# Patient Record
Sex: FEMALE | Race: WHITE | NOT HISPANIC OR LATINO | Employment: OTHER | URBAN - METROPOLITAN AREA
[De-identification: names, ages, dates, MRNs, and addresses within clinical notes are randomized per-mention and may not be internally consistent; named-entity substitution may affect disease eponyms.]

---

## 2017-01-18 ENCOUNTER — HISTORICAL (OUTPATIENT)
Dept: LAB | Facility: HOSPITAL | Age: 57
End: 2017-01-18

## 2017-03-02 ENCOUNTER — HISTORICAL (OUTPATIENT)
Dept: LAB | Facility: HOSPITAL | Age: 57
End: 2017-03-02

## 2017-03-21 ENCOUNTER — HISTORICAL (OUTPATIENT)
Dept: ADMINISTRATIVE | Facility: HOSPITAL | Age: 57
End: 2017-03-21

## 2017-03-28 ENCOUNTER — HISTORICAL (OUTPATIENT)
Dept: LAB | Facility: HOSPITAL | Age: 57
End: 2017-03-28

## 2017-05-18 ENCOUNTER — HISTORICAL (OUTPATIENT)
Dept: LAB | Facility: HOSPITAL | Age: 57
End: 2017-05-18

## 2017-05-18 ENCOUNTER — APPOINTMENT (RX ONLY)
Age: 57
Setting detail: DERMATOLOGY
End: 2017-05-18

## 2017-05-18 ENCOUNTER — HISTORICAL (OUTPATIENT)
Dept: PREADMISSION TESTING | Facility: HOSPITAL | Age: 57
End: 2017-05-18

## 2017-05-18 DIAGNOSIS — L71.8 OTHER ROSACEA: ICD-10-CM | Status: STABLE

## 2017-05-18 DIAGNOSIS — L57.0 ACTINIC KERATOSIS: ICD-10-CM | Status: INADEQUATELY CONTROLLED

## 2017-05-18 DIAGNOSIS — L82.1 OTHER SEBORRHEIC KERATOSIS: ICD-10-CM | Status: STABLE

## 2017-05-18 DIAGNOSIS — D22 MELANOCYTIC NEVI: ICD-10-CM | Status: STABLE

## 2017-05-18 PROBLEM — D48.5 NEOPLASM OF UNCERTAIN BEHAVIOR OF SKIN: Status: ACTIVE | Noted: 2017-05-18

## 2017-05-18 PROBLEM — D22.5 MELANOCYTIC NEVI OF TRUNK: Status: ACTIVE | Noted: 2017-05-18

## 2017-05-18 LAB
ABS NEUT (OLG): 4.28 X10(3)/MCL (ref 2.1–9.2)
APTT PPP: 31.7 SECOND(S) (ref 20.6–36)
BASOPHILS # BLD AUTO: 0 X10(3)/MCL (ref 0–0.2)
BASOPHILS NFR BLD AUTO: 0 %
BUN SERPL-MCNC: 16 MG/DL (ref 7–18)
CALCIUM SERPL-MCNC: 9 MG/DL (ref 8.5–10.1)
CHLORIDE SERPL-SCNC: 103 MMOL/L (ref 98–107)
CO2 SERPL-SCNC: 30 MMOL/L (ref 21–32)
CREAT SERPL-MCNC: 1.1 MG/DL (ref 0.55–1.02)
EOSINOPHIL # BLD AUTO: 0.1 X10(3)/MCL (ref 0–0.9)
EOSINOPHIL NFR BLD AUTO: 1 %
ERYTHROCYTE [DISTWIDTH] IN BLOOD BY AUTOMATED COUNT: 14.6 % (ref 11.5–17)
GLUCOSE SERPL-MCNC: 71 MG/DL (ref 74–106)
HCT VFR BLD AUTO: 43.4 % (ref 37–47)
HGB BLD-MCNC: 13.9 GM/DL (ref 12–16)
INR PPP: 0.98 (ref 0–1.27)
LYMPHOCYTES # BLD AUTO: 3 X10(3)/MCL (ref 0.6–4.6)
LYMPHOCYTES NFR BLD AUTO: 36 %
MCH RBC QN AUTO: 29.4 PG (ref 27–31)
MCHC RBC AUTO-ENTMCNC: 32 GM/DL (ref 33–36)
MCV RBC AUTO: 91.8 FL (ref 80–94)
MONOCYTES # BLD AUTO: 0.8 X10(3)/MCL (ref 0.1–1.3)
MONOCYTES NFR BLD AUTO: 10 %
NEUTROPHILS # BLD AUTO: 4.28 X10(3)/MCL (ref 1.4–7.9)
NEUTROPHILS NFR BLD AUTO: 52 %
PLATELET # BLD AUTO: 346 X10(3)/MCL (ref 130–400)
PMV BLD AUTO: 9.2 FL (ref 9.4–12.4)
POTASSIUM SERPL-SCNC: 4.4 MMOL/L (ref 3.5–5.1)
PROTHROMBIN TIME: 12.8 SECOND(S) (ref 12.1–14.2)
RBC # BLD AUTO: 4.73 X10(6)/MCL (ref 4.2–5.4)
SODIUM SERPL-SCNC: 140 MMOL/L (ref 136–145)
WBC # SPEC AUTO: 8.2 X10(3)/MCL (ref 4.5–11.5)

## 2017-05-18 PROCEDURE — ? COUNSELING

## 2017-05-18 PROCEDURE — 17000 DESTRUCT PREMALG LESION: CPT

## 2017-05-18 PROCEDURE — 99214 OFFICE O/P EST MOD 30 MIN: CPT | Mod: 25

## 2017-05-18 PROCEDURE — ? LIQUID NITROGEN

## 2017-05-18 PROCEDURE — ? PRESCRIPTION

## 2017-05-18 PROCEDURE — ? BIOPSY BY SHAVE METHOD

## 2017-05-18 PROCEDURE — 11100: CPT | Mod: 59

## 2017-05-18 PROCEDURE — ? ORDER FOR PHOTODYNAMIC THERAPY

## 2017-05-18 RX ORDER — TRETINOIN 0.8 MG/G
GEL TOPICAL
Qty: 1 | Refills: 1 | Status: ERX

## 2017-05-18 ASSESSMENT — LOCATION SIMPLE DESCRIPTION DERM
LOCATION SIMPLE: LEFT FOREHEAD
LOCATION SIMPLE: CHEST
LOCATION SIMPLE: RIGHT FOREARM
LOCATION SIMPLE: LEFT CHEEK
LOCATION SIMPLE: UPPER BACK

## 2017-05-18 ASSESSMENT — LOCATION ZONE DERM
LOCATION ZONE: ARM
LOCATION ZONE: TRUNK
LOCATION ZONE: FACE

## 2017-05-18 ASSESSMENT — LOCATION DETAILED DESCRIPTION DERM
LOCATION DETAILED: MIDDLE STERNUM
LOCATION DETAILED: SUPERIOR THORACIC SPINE
LOCATION DETAILED: LEFT INFERIOR CENTRAL MALAR CHEEK
LOCATION DETAILED: LEFT MID PREAURICULAR CHEEK
LOCATION DETAILED: LEFT INFERIOR MEDIAL FOREHEAD
LOCATION DETAILED: RIGHT PROXIMAL RADIAL DORSAL FOREARM

## 2017-05-18 ASSESSMENT — SEVERITY ASSESSMENT OVERALL AMONG ALL PATIENTS: IN YOUR EXPERIENCE, AMONG ALL PATIENTS YOU HAVE SEEN WITH THIS CONDITION, HOW SEVERE IS THIS PATIENT'S CONDITION?: MILD

## 2017-05-18 NOTE — PROCEDURE: BIOPSY BY SHAVE METHOD
Biopsy Type: H and E
Dressing: bandage
Destruction After The Procedure: No
Silver Nitrate Text: The wound bed was treated with silver nitrate after the biopsy was performed.
Post-Care Instructions: I reviewed with the patient in detail post-care instructions. Patient is to keep the biopsy site dry overnight, and then apply bacitracin twice daily until healed. Patient may apply hydrogen peroxide soaks to remove any crusting.
Anesthesia Type: 1% lidocaine without epinephrine
Cryotherapy Text: The wound bed was treated with cryotherapy after the biopsy was performed.
X Size Of Lesion In Cm: 0
Additional Anticipated Plans: If malignant consider Mohs surgery
Electrodesiccation Text: The wound bed was treated with electrodesiccation after the biopsy was performed.
Type Of Destruction Used: Electrodesiccation and Curettage
Billing Type: Third-Party Bill
Detail Level: Detailed
Curettage Text: The wound bed was treated with curettage after the biopsy was performed.
Size Of Lesion In Cm: 0.2
Hemostasis: Electrocautery
Biopsy Method: Dermablade
Notification Instructions: Patient will be notified of biopsy results. However, patient instructed to call the office if not contacted within 2 weeks.
Wound Care: Aquaphor
Anesthesia Volume In Cc: 0.5
Electrodesiccation And Curettage Text: The wound bed was treated with electrodesiccation and curettage after the biopsy was performed.
Consent: Verbal consent was obtained and risks were reviewed including but not limited to scarring, infection, bleeding, scabbing, incomplete removal, nerve damage and allergy to anesthesia.

## 2017-05-18 NOTE — PROCEDURE: ORDER FOR PHOTODYNAMIC THERAPY
Pdt Type: CECILIO-U
Scalp Incubation Time: 90 minutes
Detail Level: Zone
Face Incubation Time: 2 hours
Frequency Of Pdt: Two treatments
Face And Neck Incubation Time: Hour
Location Override: Face
Face And Ears Incubation Time: 2 Hour
Consent: The procedure and risks were reviewed with the patient including but not limited to: burning, pigmentary changes, pain, blistering, scabbing, redness, and the possibility of needing numerous treatments. Strict photoprotection after the procedure.

## 2017-05-30 ENCOUNTER — HISTORICAL (OUTPATIENT)
Dept: SURGERY | Facility: HOSPITAL | Age: 57
End: 2017-05-30

## 2017-08-04 ENCOUNTER — HISTORICAL (OUTPATIENT)
Dept: LAB | Facility: HOSPITAL | Age: 57
End: 2017-08-04

## 2017-08-04 LAB — TSH SERPL-ACNC: 1.3 MIU/ML (ref 0.35–3.75)

## 2017-08-09 ENCOUNTER — HISTORICAL (OUTPATIENT)
Dept: RADIOLOGY | Facility: HOSPITAL | Age: 57
End: 2017-08-09

## 2017-09-07 ENCOUNTER — APPOINTMENT (RX ONLY)
Age: 57
Setting detail: DERMATOLOGY
End: 2017-09-07

## 2017-09-07 DIAGNOSIS — L65.8 OTHER SPECIFIED NONSCARRING HAIR LOSS: ICD-10-CM | Status: INADEQUATELY CONTROLLED

## 2017-09-07 DIAGNOSIS — L63.8 OTHER ALOPECIA AREATA: ICD-10-CM | Status: INADEQUATELY CONTROLLED

## 2017-09-07 DIAGNOSIS — L57.0 ACTINIC KERATOSIS: ICD-10-CM | Status: INADEQUATELY CONTROLLED

## 2017-09-07 PROCEDURE — ? EDUCATIONAL RESOURCES PROVIDED

## 2017-09-07 PROCEDURE — ? TREATMENT REGIMEN

## 2017-09-07 PROCEDURE — ? ORDER FOR PHOTODYNAMIC THERAPY

## 2017-09-07 PROCEDURE — 99214 OFFICE O/P EST MOD 30 MIN: CPT

## 2017-09-07 PROCEDURE — ? COUNSELING

## 2017-09-07 PROCEDURE — ? DEFER

## 2017-09-07 ASSESSMENT — WOMENS ALOPECIA SEVERITY SCALE: PLEASE ASSSESS THE PATIENT'S MID SCALP ALOPECIA SEVERITY BY COMPARING IT TO THESE PHOTOS: MILD HAIR LOSS

## 2017-09-07 ASSESSMENT — LOCATION ZONE DERM
LOCATION ZONE: FACE
LOCATION ZONE: SCALP

## 2017-09-07 ASSESSMENT — LOCATION DETAILED DESCRIPTION DERM
LOCATION DETAILED: LEFT SUPERIOR PARIETAL SCALP
LOCATION DETAILED: MID-OCCIPITAL SCALP
LOCATION DETAILED: LEFT MEDIAL FOREHEAD

## 2017-09-07 ASSESSMENT — SEVERITY ASSESSMENT OVERALL AMONG ALL PATIENTS
IN YOUR EXPERIENCE, AMONG ALL PATIENTS YOU HAVE SEEN WITH THIS CONDITION, HOW SEVERE IS THIS PATIENT'S CONDITION?: S1 (LESS THAN 25% HAIR LOSS)

## 2017-09-07 ASSESSMENT — LOCATION SIMPLE DESCRIPTION DERM
LOCATION SIMPLE: POSTERIOR SCALP
LOCATION SIMPLE: LEFT FOREHEAD
LOCATION SIMPLE: SCALP

## 2017-09-07 NOTE — PROCEDURE: TREATMENT REGIMEN
Notification: Please note that there are new Treatment Regimen plans which have an enhanced patient education handout experience.  The plans are called Treatment Regimen (Acne), Treatment Regimen (Atopic Dermatitis), Treatment Regimen (Rosacea), Treatment Regimen (Sun Protection), Treatment Regimen (Cosmetic Products), and Treatment Regimen (Xerosis).
Detail Level: Detailed
Otc Regimen: Allegra 1 daily.\\nMinoxidil 5% solution/foam.\\nBiotin 2-2.5mg daily

## 2017-09-07 NOTE — PROCEDURE: ORDER FOR PHOTODYNAMIC THERAPY
Scalp Incubation Time: 90 minutes
Hands Incubation Time: 2 Hours
Detail Level: Zone
Incubation Override: 120 minutes
Frequency Of Pdt: Single Treatment
Location Override: face
Face And Ears Incubation Time: 2 Hour
Face Incubation Time: 105 minutes
Face And Neck Incubation Time: Hour
Pdt Type: CECILIO-U
Consent: The procedure and risks were reviewed with the patient including but not limited to: burning, pigmentary changes, pain, blistering, scabbing, redness, and the possibility of needing numerous treatments. Strict photoprotection after the procedure.

## 2017-09-07 NOTE — HPI: HAIR LOSS
How Did The Hair Loss Occur?: sudden in onset
How Severe Is Your Hair Loss?: mild
Additional History: PCP did lab work last month and WNL (thyroid, iron and Vitamin D)

## 2017-09-25 ENCOUNTER — HISTORICAL (OUTPATIENT)
Dept: ADMINISTRATIVE | Facility: HOSPITAL | Age: 57
End: 2017-09-25

## 2017-10-04 ENCOUNTER — APPOINTMENT (RX ONLY)
Age: 57
Setting detail: DERMATOLOGY
End: 2017-10-04

## 2017-10-04 DIAGNOSIS — L57.8 OTHER SKIN CHANGES DUE TO CHRONIC EXPOSURE TO NONIONIZING RADIATION: ICD-10-CM | Status: INADEQUATELY CONTROLLED

## 2017-10-04 DIAGNOSIS — L57.0 ACTINIC KERATOSIS: ICD-10-CM

## 2017-10-04 PROCEDURE — ? COUNSELING

## 2017-10-04 PROCEDURE — 99213 OFFICE O/P EST LOW 20 MIN: CPT | Mod: 25

## 2017-10-04 PROCEDURE — ? PDT: BLUE

## 2017-10-04 PROCEDURE — 96567 PDT DSTR PRMLG LES SKN: CPT

## 2017-10-04 ASSESSMENT — LOCATION DETAILED DESCRIPTION DERM
LOCATION DETAILED: RIGHT INFERIOR CENTRAL MALAR CHEEK
LOCATION DETAILED: LEFT INFERIOR CENTRAL MALAR CHEEK

## 2017-10-04 ASSESSMENT — LOCATION SIMPLE DESCRIPTION DERM
LOCATION SIMPLE: LEFT CHEEK
LOCATION SIMPLE: RIGHT CHEEK

## 2017-10-04 ASSESSMENT — LOCATION ZONE DERM: LOCATION ZONE: FACE

## 2017-10-04 NOTE — PROCEDURE: PDT: BLUE
Which Photosensitizer Was Used: Levulan
Anesthesia Type: 1% lidocaine with epinephrine
Occlusion: No
Pre-Procedure Text: The treatment areas were cleaned and prepped in the usual fashion.
Total Number Of Aks Treated (Optional To Report): 0
Post-Care Instructions: I reviewed with the patient in detail post-care instructions. Patient is to avoid sunlight for the next 2 days, and wear sun protection. Patients may expect sunburn like redness, discomfort and scabbing.
Comments: Patient’s  is driving her home with a towel over her face, samples given
Consent: Verbal consent obtained.  The risks were reviewed with the patient including but not limited to: pigmentary changes, pain, blistering, scabbing, redness, and the remote possibility of scarring.
Ndc# (Optional): [id number]
Light Source: Fuad-U
Number Of Kerasticks/Tubes Billed For: 1
Detail Level: Zone
Incubation Time: 2 hours 15 minutes
Illumination Time: 00:16:40

## 2017-10-17 ENCOUNTER — HISTORICAL (OUTPATIENT)
Dept: ADMINISTRATIVE | Facility: HOSPITAL | Age: 57
End: 2017-10-17

## 2017-11-06 ENCOUNTER — APPOINTMENT (RX ONLY)
Age: 57
Setting detail: DERMATOLOGY
End: 2017-11-06

## 2017-11-06 DIAGNOSIS — L82.1 OTHER SEBORRHEIC KERATOSIS: ICD-10-CM | Status: STABLE

## 2017-11-06 DIAGNOSIS — Z87.2 PERSONAL HISTORY OF DISEASES OF THE SKIN AND SUBCUTANEOUS TISSUE: ICD-10-CM | Status: RESOLVED

## 2017-11-06 DIAGNOSIS — D22 MELANOCYTIC NEVI: ICD-10-CM | Status: STABLE

## 2017-11-06 DIAGNOSIS — L63.8 OTHER ALOPECIA AREATA: ICD-10-CM | Status: RESOLVING

## 2017-11-06 DIAGNOSIS — L71.8 OTHER ROSACEA: ICD-10-CM | Status: STABLE

## 2017-11-06 DIAGNOSIS — L65.8 OTHER SPECIFIED NONSCARRING HAIR LOSS: ICD-10-CM | Status: IMPROVED

## 2017-11-06 PROBLEM — D22.39 MELANOCYTIC NEVI OF OTHER PARTS OF FACE: Status: ACTIVE | Noted: 2017-11-06

## 2017-11-06 PROCEDURE — ? COUNSELING

## 2017-11-06 PROCEDURE — ? TREATMENT REGIMEN

## 2017-11-06 PROCEDURE — 99212 OFFICE O/P EST SF 10 MIN: CPT

## 2017-11-06 ASSESSMENT — LOCATION DETAILED DESCRIPTION DERM
LOCATION DETAILED: RIGHT MEDIAL FOREHEAD
LOCATION DETAILED: INFERIOR MID FOREHEAD
LOCATION DETAILED: MID-OCCIPITAL SCALP

## 2017-11-06 ASSESSMENT — SEVERITY ASSESSMENT OVERALL AMONG ALL PATIENTS
IN YOUR EXPERIENCE, AMONG ALL PATIENTS YOU HAVE SEEN WITH THIS CONDITION, HOW SEVERE IS THIS PATIENT'S CONDITION?: S0 (NO HAIR LOSS)
IN YOUR EXPERIENCE, AMONG ALL PATIENTS YOU HAVE SEEN WITH THIS CONDITION, HOW SEVERE IS THIS PATIENT'S CONDITION?: MILD

## 2017-11-06 ASSESSMENT — LOCATION ZONE DERM
LOCATION ZONE: SCALP
LOCATION ZONE: FACE

## 2017-11-06 ASSESSMENT — LOCATION SIMPLE DESCRIPTION DERM
LOCATION SIMPLE: RIGHT FOREHEAD
LOCATION SIMPLE: INFERIOR FOREHEAD
LOCATION SIMPLE: POSTERIOR SCALP

## 2017-11-06 ASSESSMENT — WOMENS ALOPECIA SEVERITY SCALE: PLEASE ASSSESS THE PATIENT'S MID SCALP ALOPECIA SEVERITY BY COMPARING IT TO THESE PHOTOS: MILD HAIR LOSS

## 2017-11-06 NOTE — PROCEDURE: TREATMENT REGIMEN
Continue Regimen: Biotin 2.5-3mg daily
Detail Level: Zone
Detail Level: Detailed
Continue Regimen: Biotin 2.5-3mg daily as per patient’s request
Continue Regimen: Retin-A micro 0.8% gel daily

## 2018-02-05 ENCOUNTER — HISTORICAL (OUTPATIENT)
Dept: LAB | Facility: HOSPITAL | Age: 58
End: 2018-02-05

## 2018-02-05 LAB — TSH SERPL-ACNC: 1.25 MIU/ML (ref 0.35–3.75)

## 2018-05-03 ENCOUNTER — APPOINTMENT (RX ONLY)
Age: 58
Setting detail: DERMATOLOGY
End: 2018-05-03

## 2018-05-03 DIAGNOSIS — L82.1 OTHER SEBORRHEIC KERATOSIS: ICD-10-CM | Status: STABLE

## 2018-05-03 DIAGNOSIS — D22 MELANOCYTIC NEVI: ICD-10-CM | Status: STABLE

## 2018-05-03 DIAGNOSIS — L71.8 OTHER ROSACEA: ICD-10-CM | Status: STABLE

## 2018-05-03 DIAGNOSIS — L57.0 ACTINIC KERATOSIS: ICD-10-CM | Status: INADEQUATELY CONTROLLED

## 2018-05-03 PROBLEM — D22.5 MELANOCYTIC NEVI OF TRUNK: Status: ACTIVE | Noted: 2018-05-03

## 2018-05-03 PROCEDURE — ? LIQUID NITROGEN

## 2018-05-03 PROCEDURE — 99214 OFFICE O/P EST MOD 30 MIN: CPT | Mod: 25

## 2018-05-03 PROCEDURE — ? COUNSELING

## 2018-05-03 PROCEDURE — ? TREATMENT REGIMEN

## 2018-05-03 PROCEDURE — 17000 DESTRUCT PREMALG LESION: CPT

## 2018-05-03 ASSESSMENT — SEVERITY ASSESSMENT OVERALL AMONG ALL PATIENTS: IN YOUR EXPERIENCE, AMONG ALL PATIENTS YOU HAVE SEEN WITH THIS CONDITION, HOW SEVERE IS THIS PATIENT'S CONDITION?: MILD

## 2018-05-03 ASSESSMENT — LOCATION SIMPLE DESCRIPTION DERM
LOCATION SIMPLE: LEFT CHEEK
LOCATION SIMPLE: LEFT ZYGOMA
LOCATION SIMPLE: RIGHT UPPER BACK
LOCATION SIMPLE: CHEST

## 2018-05-03 ASSESSMENT — LOCATION ZONE DERM
LOCATION ZONE: FACE
LOCATION ZONE: TRUNK

## 2018-05-03 ASSESSMENT — LOCATION DETAILED DESCRIPTION DERM
LOCATION DETAILED: LOWER STERNUM
LOCATION DETAILED: LEFT INFERIOR CENTRAL MALAR CHEEK
LOCATION DETAILED: LEFT CENTRAL ZYGOMA
LOCATION DETAILED: RIGHT SUPERIOR MEDIAL UPPER BACK

## 2018-07-21 ENCOUNTER — HISTORICAL (OUTPATIENT)
Dept: LAB | Facility: HOSPITAL | Age: 58
End: 2018-07-21

## 2018-07-21 LAB
ALBUMIN SERPL-MCNC: 3.2 GM/DL (ref 3.4–5)
ALBUMIN/GLOB SERPL: 0.9 RATIO (ref 1.1–2)
ALP SERPL-CCNC: 73 UNIT/L (ref 46–116)
ALT SERPL-CCNC: 26 UNIT/L (ref 12–78)
AST SERPL-CCNC: 13 UNIT/L (ref 10–37)
BILIRUB SERPL-MCNC: 0.2 MG/DL (ref 0.2–1)
BILIRUBIN DIRECT+TOT PNL SERPL-MCNC: 0.08 MG/DL (ref 0–0.2)
BILIRUBIN DIRECT+TOT PNL SERPL-MCNC: 0.12 MG/DL
BUN SERPL-MCNC: 17 MG/DL (ref 7–18)
CALCIUM SERPL-MCNC: 8.6 MG/DL (ref 8.5–10.1)
CHLORIDE SERPL-SCNC: 106 MMOL/L (ref 98–107)
CHOLEST SERPL-MCNC: 190 MG/DL (ref 50–200)
CHOLEST/HDLC SERPL: 3 {RATIO} (ref 0–5)
CO2 SERPL-SCNC: 27.6 MMOL/L (ref 21–32)
CREAT SERPL-MCNC: 0.68 MG/DL (ref 0.55–1.02)
GLOBULIN SER-MCNC: 3.6 GM/DL (ref 2.4–3.5)
GLUCOSE SERPL-MCNC: 98 MG/DL (ref 74–106)
HDLC SERPL-MCNC: 57 MG/DL (ref 35–60)
LDLC SERPL CALC-MCNC: 117 MG/DL (ref 50–140)
POTASSIUM SERPL-SCNC: 4.2 MMOL/L (ref 3.5–5.1)
PROT SERPL-MCNC: 6.8 GM/DL (ref 6.4–8.2)
SODIUM SERPL-SCNC: 142 MMOL/L (ref 136–145)
TRIGL SERPL-MCNC: 79 MG/DL (ref 30–150)
TSH SERPL-ACNC: 1.37 MIU/ML (ref 0.35–3.75)
VLDLC SERPL CALC-MCNC: 16 MG/DL

## 2018-08-08 ENCOUNTER — HISTORICAL (OUTPATIENT)
Dept: RADIOLOGY | Facility: HOSPITAL | Age: 58
End: 2018-08-08

## 2018-11-06 ENCOUNTER — APPOINTMENT (RX ONLY)
Age: 58
Setting detail: DERMATOLOGY
End: 2018-11-06

## 2018-11-06 DIAGNOSIS — L71.8 OTHER ROSACEA: ICD-10-CM | Status: STABLE

## 2018-11-06 DIAGNOSIS — L82.1 OTHER SEBORRHEIC KERATOSIS: ICD-10-CM | Status: STABLE

## 2018-11-06 DIAGNOSIS — D22 MELANOCYTIC NEVI: ICD-10-CM | Status: STABLE

## 2018-11-06 DIAGNOSIS — Z87.2 PERSONAL HISTORY OF DISEASES OF THE SKIN AND SUBCUTANEOUS TISSUE: ICD-10-CM | Status: WELL CONTROLLED

## 2018-11-06 PROBLEM — D22.5 MELANOCYTIC NEVI OF TRUNK: Status: ACTIVE | Noted: 2018-11-06

## 2018-11-06 PROCEDURE — 99214 OFFICE O/P EST MOD 30 MIN: CPT

## 2018-11-06 PROCEDURE — ? TREATMENT REGIMEN

## 2018-11-06 PROCEDURE — ? COUNSELING

## 2018-11-06 ASSESSMENT — LOCATION SIMPLE DESCRIPTION DERM
LOCATION SIMPLE: RIGHT UPPER BACK
LOCATION SIMPLE: RIGHT CHEEK
LOCATION SIMPLE: CHEST
LOCATION SIMPLE: LEFT CHEEK

## 2018-11-06 ASSESSMENT — LOCATION DETAILED DESCRIPTION DERM
LOCATION DETAILED: LEFT INFERIOR CENTRAL MALAR CHEEK
LOCATION DETAILED: RIGHT INFERIOR CENTRAL MALAR CHEEK
LOCATION DETAILED: RIGHT SUPERIOR MEDIAL UPPER BACK
LOCATION DETAILED: LOWER STERNUM

## 2018-11-06 ASSESSMENT — LOCATION ZONE DERM
LOCATION ZONE: FACE
LOCATION ZONE: TRUNK

## 2018-11-06 ASSESSMENT — SEVERITY ASSESSMENT OVERALL AMONG ALL PATIENTS: IN YOUR EXPERIENCE, AMONG ALL PATIENTS YOU HAVE SEEN WITH THIS CONDITION, HOW SEVERE IS THIS PATIENT'S CONDITION?: MILD

## 2018-12-06 ENCOUNTER — HISTORICAL (OUTPATIENT)
Dept: RADIOLOGY | Facility: HOSPITAL | Age: 58
End: 2018-12-06

## 2019-03-27 ENCOUNTER — HISTORICAL (OUTPATIENT)
Dept: LAB | Facility: HOSPITAL | Age: 59
End: 2019-03-27

## 2019-05-08 ENCOUNTER — APPOINTMENT (RX ONLY)
Age: 59
Setting detail: DERMATOLOGY
End: 2019-05-08

## 2019-05-08 DIAGNOSIS — L71.8 OTHER ROSACEA: ICD-10-CM | Status: WELL CONTROLLED

## 2019-05-08 DIAGNOSIS — D22 MELANOCYTIC NEVI: ICD-10-CM | Status: STABLE

## 2019-05-08 DIAGNOSIS — L82.1 OTHER SEBORRHEIC KERATOSIS: ICD-10-CM | Status: STABLE

## 2019-05-08 DIAGNOSIS — L57.0 ACTINIC KERATOSIS: ICD-10-CM | Status: INADEQUATELY CONTROLLED

## 2019-05-08 PROBLEM — D22.39 MELANOCYTIC NEVI OF OTHER PARTS OF FACE: Status: ACTIVE | Noted: 2019-05-08

## 2019-05-08 PROCEDURE — ? TREATMENT REGIMEN

## 2019-05-08 PROCEDURE — ? PATIENT SPECIFIC COUNSELING

## 2019-05-08 PROCEDURE — 99213 OFFICE O/P EST LOW 20 MIN: CPT

## 2019-05-08 PROCEDURE — ? COUNSELING

## 2019-05-08 PROCEDURE — ? PRESCRIPTION

## 2019-05-08 RX ORDER — FLUOROURACIL 40 MG/G
CREAM TOPICAL
Qty: 1 | Refills: 0 | Status: ERX

## 2019-05-08 ASSESSMENT — LOCATION DETAILED DESCRIPTION DERM
LOCATION DETAILED: LEFT MEDIAL FOREHEAD
LOCATION DETAILED: LEFT INFERIOR LATERAL FOREHEAD
LOCATION DETAILED: LEFT INFERIOR CENTRAL MALAR CHEEK

## 2019-05-08 ASSESSMENT — LOCATION ZONE DERM: LOCATION ZONE: FACE

## 2019-05-08 ASSESSMENT — LOCATION SIMPLE DESCRIPTION DERM
LOCATION SIMPLE: LEFT CHEEK
LOCATION SIMPLE: LEFT FOREHEAD

## 2019-05-08 ASSESSMENT — SEVERITY ASSESSMENT OVERALL AMONG ALL PATIENTS: IN YOUR EXPERIENCE, AMONG ALL PATIENTS YOU HAVE SEEN WITH THIS CONDITION, HOW SEVERE IS THIS PATIENT'S CONDITION?: MILD

## 2019-05-08 NOTE — HPI: SKIN LESION
How Severe Is Your Skin Lesion?: mild
Has Your Skin Lesion Been Treated?: not been treated
Is This A New Presentation, Or A Follow-Up?: Skin Lesions
Additional History: Patient declines full skin exam.

## 2019-08-20 ENCOUNTER — HISTORICAL (OUTPATIENT)
Dept: RADIOLOGY | Facility: HOSPITAL | Age: 59
End: 2019-08-20

## 2019-09-10 ENCOUNTER — APPOINTMENT (RX ONLY)
Age: 59
Setting detail: DERMATOLOGY
End: 2019-09-10

## 2019-09-10 DIAGNOSIS — L57.0 ACTINIC KERATOSIS: ICD-10-CM | Status: INADEQUATELY CONTROLLED

## 2019-09-10 DIAGNOSIS — Z87.2 PERSONAL HISTORY OF DISEASES OF THE SKIN AND SUBCUTANEOUS TISSUE: ICD-10-CM | Status: RESOLVED

## 2019-09-10 DIAGNOSIS — L81.8 OTHER SPECIFIED DISORDERS OF PIGMENTATION: ICD-10-CM | Status: RESOLVING

## 2019-09-10 PROCEDURE — 99213 OFFICE O/P EST LOW 20 MIN: CPT

## 2019-09-10 PROCEDURE — ? TREATMENT REGIMEN

## 2019-09-10 ASSESSMENT — LOCATION DETAILED DESCRIPTION DERM
LOCATION DETAILED: LEFT INFERIOR TEMPLE
LOCATION DETAILED: LEFT INFERIOR MEDIAL FOREHEAD
LOCATION DETAILED: LEFT CENTRAL MALAR CHEEK
LOCATION DETAILED: LEFT INFERIOR CENTRAL MALAR CHEEK

## 2019-09-10 ASSESSMENT — LOCATION SIMPLE DESCRIPTION DERM
LOCATION SIMPLE: LEFT TEMPLE
LOCATION SIMPLE: LEFT FOREHEAD
LOCATION SIMPLE: LEFT CHEEK

## 2019-09-10 ASSESSMENT — LOCATION ZONE DERM: LOCATION ZONE: FACE

## 2019-09-10 NOTE — PROCEDURE: TREATMENT REGIMEN
Initiate Treatment: Restart Tolak at night to the right cheek, nose, right temple
Plan: Delay 2 months
Detail Level: Simple

## 2019-09-16 ENCOUNTER — HISTORICAL (OUTPATIENT)
Dept: LAB | Facility: HOSPITAL | Age: 59
End: 2019-09-16

## 2019-09-16 LAB — TSH SERPL-ACNC: 1.41 MIU/ML (ref 0.35–3.75)

## 2019-11-01 ENCOUNTER — RX ONLY (OUTPATIENT)
Age: 59
Setting detail: RX ONLY
End: 2019-11-01

## 2019-11-01 RX ORDER — TRETINOIN 0.8 MG/G
GEL TOPICAL
Qty: 1 | Refills: 0 | Status: ERX

## 2019-11-02 ENCOUNTER — HISTORICAL (OUTPATIENT)
Dept: LAB | Facility: HOSPITAL | Age: 59
End: 2019-11-02

## 2019-11-02 LAB
ALBUMIN SERPL-MCNC: 3.4 GM/DL (ref 3.4–5)
ALBUMIN/GLOB SERPL: 0.9 RATIO (ref 1.1–2)
ALP SERPL-CCNC: 72 UNIT/L (ref 46–116)
ALT SERPL-CCNC: 27 UNIT/L (ref 12–78)
AST SERPL-CCNC: 11 UNIT/L (ref 10–37)
BILIRUB SERPL-MCNC: 0.3 MG/DL (ref 0.2–1)
BILIRUBIN DIRECT+TOT PNL SERPL-MCNC: 0.1 MG/DL (ref 0–0.2)
BILIRUBIN DIRECT+TOT PNL SERPL-MCNC: 0.2 MG/DL
BUN SERPL-MCNC: 13 MG/DL (ref 7–18)
CALCIUM SERPL-MCNC: 9.3 MG/DL (ref 8.5–10.1)
CHLORIDE SERPL-SCNC: 107 MMOL/L (ref 98–107)
CHOLEST SERPL-MCNC: 176 MG/DL (ref 50–200)
CHOLEST/HDLC SERPL: 3 {RATIO} (ref 0–5)
CO2 SERPL-SCNC: 28.8 MMOL/L (ref 21–32)
CREAT SERPL-MCNC: 0.67 MG/DL (ref 0.55–1.02)
DEPRECATED CALCIDIOL+CALCIFEROL SERPL-MC: 23.39 NG/ML (ref 30–80)
GLOBULIN SER-MCNC: 3.6 GM/DL (ref 2.4–3.5)
GLUCOSE SERPL-MCNC: 96 MG/DL (ref 74–106)
HDLC SERPL-MCNC: 53 MG/DL (ref 35–60)
LDLC SERPL CALC-MCNC: 109 MG/DL (ref 50–140)
POTASSIUM SERPL-SCNC: 4.1 MMOL/L (ref 3.5–5.1)
PROT SERPL-MCNC: 7 GM/DL (ref 6.4–8.2)
SODIUM SERPL-SCNC: 144 MMOL/L (ref 136–145)
TRIGL SERPL-MCNC: 72 MG/DL (ref 30–150)
TSH SERPL-ACNC: 1.96 MIU/ML (ref 0.35–3.75)
VLDLC SERPL CALC-MCNC: 14 MG/DL

## 2019-12-06 LAB
ABS NEUT (OLG): 3.9 X10(3)/MCL (ref 1.5–6.9)
ALBUMIN SERPL-MCNC: 3.5 GM/DL (ref 3.4–5)
ALBUMIN/GLOB SERPL: 0.9 RATIO
ALP SERPL-CCNC: 75 UNIT/L (ref 30–113)
ALT SERPL-CCNC: 30 UNIT/L (ref 10–45)
APTT PPP: 28.9 SECOND(S) (ref 25–35)
AST SERPL-CCNC: 12 UNIT/L (ref 15–37)
BILIRUB SERPL-MCNC: 0.2 MG/DL (ref 0.1–0.9)
BILIRUBIN DIRECT+TOT PNL SERPL-MCNC: 0.1 MG/DL
BILIRUBIN DIRECT+TOT PNL SERPL-MCNC: 0.1 MG/DL (ref 0–0.3)
BUN SERPL-MCNC: 16 MG/DL (ref 10–20)
CALCIUM SERPL-MCNC: 9 MG/DL (ref 8–10.5)
CHLORIDE SERPL-SCNC: 108 MMOL/L (ref 100–108)
CO2 SERPL-SCNC: 28 MMOL/L (ref 21–35)
CREAT SERPL-MCNC: 0.8 MG/DL (ref 0.7–1.3)
ERYTHROCYTE [DISTWIDTH] IN BLOOD BY AUTOMATED COUNT: 14.4 % (ref 11.5–17)
GLOBULIN SER-MCNC: 3.7 GM/DL
GLUCOSE SERPL-MCNC: 107 MG/DL (ref 75–116)
GROUP & RH: NORMAL
HCT VFR BLD AUTO: 44.3 % (ref 36–48)
HGB BLD-MCNC: 13.9 GM/DL (ref 12–16)
INR PPP: 1 (ref 0–1.2)
MCH RBC QN AUTO: 29 PG (ref 27–34)
MCHC RBC AUTO-ENTMCNC: 31 GM/DL (ref 31–36)
MCV RBC AUTO: 92 FL (ref 80–99)
PLATELET # BLD AUTO: 335 X10(3)/MCL (ref 140–400)
PMV BLD AUTO: 8.5 FL (ref 6.8–10)
POTASSIUM SERPL-SCNC: 4 MMOL/L (ref 3.6–5.2)
PROT SERPL-MCNC: 7.2 GM/DL (ref 6.4–8.2)
PROTHROMBIN TIME: 9.8 SECOND(S) (ref 9–12)
RBC # BLD AUTO: 4.8 X10(6)/MCL (ref 4.2–5.4)
SODIUM SERPL-SCNC: 144 MMOL/L (ref 135–145)
WBC # SPEC AUTO: 7.3 X10(3)/MCL (ref 4.5–11.5)

## 2019-12-12 ENCOUNTER — HISTORICAL (OUTPATIENT)
Dept: ANESTHESIOLOGY | Facility: HOSPITAL | Age: 59
End: 2019-12-12

## 2020-01-13 ENCOUNTER — APPOINTMENT (RX ONLY)
Age: 60
Setting detail: DERMATOLOGY
End: 2020-01-13

## 2020-01-13 DIAGNOSIS — L82.1 OTHER SEBORRHEIC KERATOSIS: ICD-10-CM | Status: STABLE

## 2020-01-13 DIAGNOSIS — Z87.2 PERSONAL HISTORY OF DISEASES OF THE SKIN AND SUBCUTANEOUS TISSUE: ICD-10-CM | Status: RESOLVED

## 2020-01-13 DIAGNOSIS — L81.8 OTHER SPECIFIED DISORDERS OF PIGMENTATION: ICD-10-CM | Status: RESOLVING

## 2020-01-13 DIAGNOSIS — D22 MELANOCYTIC NEVI: ICD-10-CM | Status: STABLE

## 2020-01-13 PROBLEM — D22.39 MELANOCYTIC NEVI OF OTHER PARTS OF FACE: Status: ACTIVE | Noted: 2020-01-13

## 2020-01-13 PROCEDURE — 99213 OFFICE O/P EST LOW 20 MIN: CPT

## 2020-01-13 ASSESSMENT — LOCATION DETAILED DESCRIPTION DERM
LOCATION DETAILED: SUPERIOR MID FOREHEAD
LOCATION DETAILED: LEFT INFERIOR MEDIAL FOREHEAD
LOCATION DETAILED: RIGHT INFERIOR CENTRAL MALAR CHEEK

## 2020-01-13 ASSESSMENT — LOCATION SIMPLE DESCRIPTION DERM
LOCATION SIMPLE: LEFT FOREHEAD
LOCATION SIMPLE: SUPERIOR FOREHEAD
LOCATION SIMPLE: RIGHT CHEEK

## 2020-01-13 ASSESSMENT — SEVERITY ASSESSMENT: SEVERITY: MILD

## 2020-01-13 ASSESSMENT — LOCATION ZONE DERM: LOCATION ZONE: FACE

## 2020-03-14 ENCOUNTER — HISTORICAL (OUTPATIENT)
Dept: LAB | Facility: HOSPITAL | Age: 60
End: 2020-03-14

## 2020-03-31 ENCOUNTER — HISTORICAL (OUTPATIENT)
Dept: LAB | Facility: HOSPITAL | Age: 60
End: 2020-03-31

## 2020-03-31 LAB — DEPRECATED CALCIDIOL+CALCIFEROL SERPL-MC: 43.4 NG/ML (ref 6.6–49.9)

## 2020-05-12 LAB — CRC RECOMMENDATION EXT: NORMAL

## 2020-05-26 ENCOUNTER — RX ONLY (OUTPATIENT)
Age: 60
Setting detail: RX ONLY
End: 2020-05-26

## 2020-05-26 RX ORDER — TRETINOIN 0.8 MG/G
GEL TOPICAL
Qty: 1 | Refills: 0 | Status: ERX

## 2020-07-14 ENCOUNTER — APPOINTMENT (RX ONLY)
Age: 60
Setting detail: DERMATOLOGY
End: 2020-07-14

## 2020-07-14 DIAGNOSIS — D22 MELANOCYTIC NEVI: ICD-10-CM | Status: STABLE

## 2020-07-14 DIAGNOSIS — L71.8 OTHER ROSACEA: ICD-10-CM | Status: WELL CONTROLLED

## 2020-07-14 DIAGNOSIS — L82.1 OTHER SEBORRHEIC KERATOSIS: ICD-10-CM | Status: STABLE

## 2020-07-14 DIAGNOSIS — L57.0 ACTINIC KERATOSIS: ICD-10-CM | Status: INADEQUATELY CONTROLLED

## 2020-07-14 PROBLEM — D22.39 MELANOCYTIC NEVI OF OTHER PARTS OF FACE: Status: ACTIVE | Noted: 2020-07-14

## 2020-07-14 PROCEDURE — ? LIQUID NITROGEN

## 2020-07-14 PROCEDURE — 17003 DESTRUCT PREMALG LES 2-14: CPT

## 2020-07-14 PROCEDURE — 17000 DESTRUCT PREMALG LESION: CPT

## 2020-07-14 PROCEDURE — 99213 OFFICE O/P EST LOW 20 MIN: CPT | Mod: 25

## 2020-07-14 PROCEDURE — ? PRESCRIPTION

## 2020-07-14 PROCEDURE — ? TREATMENT REGIMEN

## 2020-07-14 RX ORDER — TRETINOIN 0.6 MG/G
GEL TOPICAL
Qty: 1 | Refills: 1 | Status: ERX

## 2020-07-14 ASSESSMENT — LOCATION SIMPLE DESCRIPTION DERM
LOCATION SIMPLE: RIGHT FOREHEAD
LOCATION SIMPLE: LEFT CHEEK

## 2020-07-14 ASSESSMENT — LOCATION ZONE DERM: LOCATION ZONE: FACE

## 2020-07-14 ASSESSMENT — LOCATION DETAILED DESCRIPTION DERM
LOCATION DETAILED: RIGHT SUPERIOR FOREHEAD
LOCATION DETAILED: RIGHT MEDIAL FOREHEAD
LOCATION DETAILED: RIGHT INFERIOR LATERAL FOREHEAD
LOCATION DETAILED: LEFT INFERIOR CENTRAL MALAR CHEEK

## 2020-07-14 ASSESSMENT — SEVERITY ASSESSMENT OVERALL AMONG ALL PATIENTS: IN YOUR EXPERIENCE, AMONG ALL PATIENTS YOU HAVE SEEN WITH THIS CONDITION, HOW SEVERE IS THIS PATIENT'S CONDITION?: MILD

## 2020-07-14 NOTE — PROCEDURE: TREATMENT REGIMEN
Otc Regimen: Nicotinamide 500mg 1 twice a day
Detail Level: Zone
Continue Regimen: Retin-A micro 0.08% gel to face at night until rx runs out then switch to Retin-A micro 0.06%

## 2020-08-10 ENCOUNTER — HISTORICAL (OUTPATIENT)
Dept: LAB | Facility: HOSPITAL | Age: 60
End: 2020-08-10

## 2020-09-01 ENCOUNTER — HISTORICAL (OUTPATIENT)
Dept: LAB | Facility: HOSPITAL | Age: 60
End: 2020-09-01

## 2020-12-17 ENCOUNTER — APPOINTMENT (RX ONLY)
Age: 60
Setting detail: DERMATOLOGY
End: 2020-12-17

## 2020-12-17 VITALS — TEMPERATURE: 97.7 F

## 2020-12-17 DIAGNOSIS — Z87.2 PERSONAL HISTORY OF DISEASES OF THE SKIN AND SUBCUTANEOUS TISSUE: ICD-10-CM | Status: STABLE

## 2020-12-17 DIAGNOSIS — L65.8 OTHER SPECIFIED NONSCARRING HAIR LOSS: ICD-10-CM | Status: INADEQUATELY CONTROLLED

## 2020-12-17 DIAGNOSIS — D22 MELANOCYTIC NEVI: ICD-10-CM | Status: STABLE

## 2020-12-17 DIAGNOSIS — L82.1 OTHER SEBORRHEIC KERATOSIS: ICD-10-CM | Status: STABLE

## 2020-12-17 DIAGNOSIS — L71.8 OTHER ROSACEA: ICD-10-CM | Status: WELL CONTROLLED

## 2020-12-17 PROBLEM — D22.5 MELANOCYTIC NEVI OF TRUNK: Status: ACTIVE | Noted: 2020-12-17

## 2020-12-17 PROCEDURE — 99214 OFFICE O/P EST MOD 30 MIN: CPT

## 2020-12-17 PROCEDURE — ? PRESCRIPTION

## 2020-12-17 PROCEDURE — ? TREATMENT REGIMEN

## 2020-12-17 PROCEDURE — ? COUNSELING

## 2020-12-17 RX ORDER — FINASTERIDE 5 MG/1
TABLET, FILM COATED ORAL
Qty: 90 | Refills: 1 | Status: ERX

## 2020-12-17 ASSESSMENT — SEVERITY ASSESSMENT OVERALL AMONG ALL PATIENTS: IN YOUR EXPERIENCE, AMONG ALL PATIENTS YOU HAVE SEEN WITH THIS CONDITION, HOW SEVERE IS THIS PATIENT'S CONDITION?: MILD

## 2020-12-17 ASSESSMENT — LOCATION ZONE DERM
LOCATION ZONE: FACE
LOCATION ZONE: TRUNK
LOCATION ZONE: SCALP

## 2020-12-17 ASSESSMENT — LOCATION SIMPLE DESCRIPTION DERM
LOCATION SIMPLE: LEFT CHEEK
LOCATION SIMPLE: LEFT UPPER BACK
LOCATION SIMPLE: ABDOMEN
LOCATION SIMPLE: LEFT FOREHEAD
LOCATION SIMPLE: SCALP

## 2020-12-17 ASSESSMENT — LOCATION DETAILED DESCRIPTION DERM
LOCATION DETAILED: LEFT INFERIOR MEDIAL UPPER BACK
LOCATION DETAILED: LEFT INFERIOR MEDIAL FOREHEAD
LOCATION DETAILED: EPIGASTRIC SKIN
LOCATION DETAILED: LEFT INFERIOR CENTRAL MALAR CHEEK
LOCATION DETAILED: LEFT SUPERIOR PARIETAL SCALP

## 2020-12-17 ASSESSMENT — WOMENS ALOPECIA SEVERITY SCALE: PLEASE ASSSESS THE PATIENT'S MID SCALP ALOPECIA SEVERITY BY COMPARING IT TO THESE PHOTOS: WIDENED MIDLINE PART

## 2020-12-17 NOTE — PROCEDURE: TREATMENT REGIMEN
Plan: Suggested having PCP check Ferritin, TSH and Vitamin D levels. Patient to have PCP send us a copy of lab results
Continue Regimen: Retin A micro 0.06% gel at night
Otc Regimen: Rogaine 5% foam 1-2 times a day
Detail Level: Zone

## 2021-06-15 ENCOUNTER — APPOINTMENT (RX ONLY)
Age: 61
Setting detail: DERMATOLOGY
End: 2021-06-15

## 2021-06-15 DIAGNOSIS — L57.0 ACTINIC KERATOSIS: ICD-10-CM | Status: INADEQUATELY CONTROLLED

## 2021-06-15 DIAGNOSIS — L65.8 OTHER SPECIFIED NONSCARRING HAIR LOSS: ICD-10-CM | Status: IMPROVED

## 2021-06-15 DIAGNOSIS — L82.1 OTHER SEBORRHEIC KERATOSIS: ICD-10-CM | Status: STABLE

## 2021-06-15 DIAGNOSIS — D22 MELANOCYTIC NEVI: ICD-10-CM | Status: STABLE

## 2021-06-15 DIAGNOSIS — L71.8 OTHER ROSACEA: ICD-10-CM | Status: STABLE

## 2021-06-15 PROBLEM — D22.39 MELANOCYTIC NEVI OF OTHER PARTS OF FACE: Status: ACTIVE | Noted: 2021-06-15

## 2021-06-15 PROCEDURE — 99213 OFFICE O/P EST LOW 20 MIN: CPT | Mod: 25

## 2021-06-15 PROCEDURE — 17000 DESTRUCT PREMALG LESION: CPT

## 2021-06-15 PROCEDURE — ? COUNSELING

## 2021-06-15 PROCEDURE — ? LIQUID NITROGEN

## 2021-06-15 PROCEDURE — ? TREATMENT REGIMEN

## 2021-06-15 ASSESSMENT — LOCATION DETAILED DESCRIPTION DERM
LOCATION DETAILED: RIGHT CENTRAL FRONTAL SCALP
LOCATION DETAILED: LEFT MEDIAL FOREHEAD
LOCATION DETAILED: LEFT SUPERIOR PARIETAL SCALP
LOCATION DETAILED: LEFT INFERIOR FOREHEAD
LOCATION DETAILED: LEFT INFERIOR CENTRAL MALAR CHEEK

## 2021-06-15 ASSESSMENT — LOCATION SIMPLE DESCRIPTION DERM
LOCATION SIMPLE: LEFT CHEEK
LOCATION SIMPLE: SCALP
LOCATION SIMPLE: RIGHT SCALP
LOCATION SIMPLE: LEFT FOREHEAD

## 2021-06-15 ASSESSMENT — SEVERITY ASSESSMENT OVERALL AMONG ALL PATIENTS: IN YOUR EXPERIENCE, AMONG ALL PATIENTS YOU HAVE SEEN WITH THIS CONDITION, HOW SEVERE IS THIS PATIENT'S CONDITION?: MILD

## 2021-06-15 ASSESSMENT — LOCATION ZONE DERM
LOCATION ZONE: FACE
LOCATION ZONE: SCALP

## 2021-06-15 ASSESSMENT — WOMENS ALOPECIA SEVERITY SCALE: PLEASE ASSSESS THE PATIENT'S MID SCALP ALOPECIA SEVERITY BY COMPARING IT TO THESE PHOTOS: MILD HAIR LOSS

## 2021-06-15 NOTE — PROCEDURE: LIQUID NITROGEN
Show Aperture Variable?: Yes
Duration Of Freeze Thaw-Cycle (Seconds): 0
Detail Level: Detailed
Render Note In Bullet Format When Appropriate: No
Consent: The patient's consent was obtained including but not limited to risks of crusting, scabbing, blistering, scarring, darker or lighter pigmentary change, recurrence, incomplete removal and infection.
Post-Care Instructions: I reviewed with the patient in detail post-care instructions. Patient is to wear sunprotection, and avoid picking at any of the treated lesions. Pt may apply Vaseline to crusted or scabbing areas.

## 2021-06-15 NOTE — HPI: SKIN LESION
How Severe Is Your Skin Lesion?: mild
Has Your Skin Lesion Been Treated?: not been treated
Is This A New Presentation, Or A Follow-Up?: Skin Lesions
Additional History: Full skin exam declined by patient.

## 2021-06-15 NOTE — PROCEDURE: TREATMENT REGIMEN
Continue Regimen: Finasteride 5mg 1 tablet daily.\\nSlowFE
Detail Level: Zone
Continue Regimen: Retin A micro 0.08% gel at night

## 2021-11-17 ENCOUNTER — HISTORICAL (OUTPATIENT)
Dept: LAB | Facility: HOSPITAL | Age: 61
End: 2021-11-17

## 2021-11-17 LAB
ABS NEUT (OLG): 4.01
ALBUMIN SERPL-MCNC: 3.6 GM/DL (ref 3.4–4.8)
ALBUMIN/GLOB SERPL: 1 RATIO (ref 1.1–2)
ALP SERPL-CCNC: 68 UNIT/L (ref 40–150)
ALT SERPL-CCNC: 18 UNIT/L (ref 0–55)
AST SERPL-CCNC: 16 UNIT/L (ref 5–34)
BASOPHILS # BLD AUTO: 0.02 X10(3)/MCL
BASOPHILS NFR BLD AUTO: 0.3 %
BILIRUB SERPL-MCNC: 0.3 MG/DL
BILIRUBIN DIRECT+TOT PNL SERPL-MCNC: 0.1 MG/DL (ref 0–0.5)
BILIRUBIN DIRECT+TOT PNL SERPL-MCNC: 0.2 MG/DL
BUN SERPL-MCNC: 26 MG/DL (ref 9.8–20.1)
CALCIUM SERPL-MCNC: 9.5 MG/DL (ref 8.7–10.5)
CHLORIDE SERPL-SCNC: 109 MMOL/L (ref 98–107)
CHOLEST SERPL-MCNC: 185 MG/DL
CHOLEST/HDLC SERPL: 4 {RATIO} (ref 0–5)
CO2 SERPL-SCNC: 27 MEQ/L (ref 23–31)
CREAT SERPL-MCNC: 0.72 MG/DL (ref 0.55–1.02)
DEPRECATED CALCIDIOL+CALCIFEROL SERPL-MC: 55.9 NG/ML (ref 30–80)
EOSINOPHIL # BLD AUTO: 0.1 X10(3)/MCL
EOSINOPHIL NFR BLD AUTO: 1.5 %
ERYTHROCYTE [DISTWIDTH] IN BLOOD BY AUTOMATED COUNT: 15 %
GLOBULIN SER-MCNC: 3.5 GM/DL (ref 2.4–3.5)
GLUCOSE SERPL-MCNC: 93 MG/DL (ref 82–115)
HCT VFR BLD AUTO: 43.6 % (ref 34–46)
HDLC SERPL-MCNC: 49 MG/DL (ref 35–60)
HGB BLD-MCNC: 13.6 GM/DL (ref 11.3–15.4)
IMM GRANULOCYTES # BLD AUTO: 0 10*3/UL (ref 0–0.1)
IMM GRANULOCYTES NFR BLD AUTO: 0 % (ref 0–1)
LDLC SERPL CALC-MCNC: 116 MG/DL (ref 50–140)
LYMPHOCYTES # BLD AUTO: 1.9 X10(3)/MCL
LYMPHOCYTES NFR BLD AUTO: 28.7 %
MCH RBC QN AUTO: 28.6 PG (ref 27–33)
MCHC RBC AUTO-ENTMCNC: 31.2 GM/DL (ref 32–35)
MCV RBC AUTO: 91.8 FL (ref 81–97)
MONOCYTES # BLD AUTO: 0.59 X10(3)/MCL
MONOCYTES NFR BLD AUTO: 8.9 %
NEUTROPHILS # BLD AUTO: 4.01 X10(3)/MCL
NEUTROPHILS NFR BLD AUTO: 60.6 %
PLATELET # BLD AUTO: 360 X10(3)/MCL (ref 140–450)
PMV BLD AUTO: 9 FL
POTASSIUM SERPL-SCNC: 4.6 MMOL/L (ref 3.5–5.1)
PROT SERPL-MCNC: 7.1 GM/DL (ref 5.8–7.6)
RBC # BLD AUTO: 4.75 X10(6)/MCL (ref 3.9–5)
SODIUM SERPL-SCNC: 143 MMOL/L (ref 136–145)
TRIGL SERPL-MCNC: 98 MG/DL (ref 37–140)
TSH SERPL-ACNC: 1.83 UIU/ML (ref 0.35–4.94)
VIT B12 SERPL-MCNC: 613 PG/ML (ref 213–816)
VLDLC SERPL CALC-MCNC: 20 MG/DL
WBC # SPEC AUTO: 6.62 X10(3)/MCL (ref 3.4–9.2)

## 2021-12-13 ENCOUNTER — APPOINTMENT (RX ONLY)
Age: 61
Setting detail: DERMATOLOGY
End: 2021-12-13

## 2021-12-13 DIAGNOSIS — L82.1 OTHER SEBORRHEIC KERATOSIS: ICD-10-CM | Status: STABLE

## 2021-12-13 DIAGNOSIS — Z87.2 PERSONAL HISTORY OF DISEASES OF THE SKIN AND SUBCUTANEOUS TISSUE: ICD-10-CM | Status: STABLE

## 2021-12-13 DIAGNOSIS — D22 MELANOCYTIC NEVI: ICD-10-CM | Status: STABLE

## 2021-12-13 DIAGNOSIS — L71.8 OTHER ROSACEA: ICD-10-CM | Status: STABLE

## 2021-12-13 DIAGNOSIS — L65.8 OTHER SPECIFIED NONSCARRING HAIR LOSS: ICD-10-CM | Status: INADEQUATELY CONTROLLED

## 2021-12-13 PROBLEM — D22.39 MELANOCYTIC NEVI OF OTHER PARTS OF FACE: Status: ACTIVE | Noted: 2021-12-13

## 2021-12-13 PROBLEM — L57.0 ACTINIC KERATOSIS: Status: ACTIVE | Noted: 2021-12-13

## 2021-12-13 PROCEDURE — 99214 OFFICE O/P EST MOD 30 MIN: CPT

## 2021-12-13 PROCEDURE — ? COUNSELING

## 2021-12-13 PROCEDURE — ? TREATMENT REGIMEN

## 2021-12-13 ASSESSMENT — LOCATION SIMPLE DESCRIPTION DERM
LOCATION SIMPLE: SCALP
LOCATION SIMPLE: LEFT CHEEK
LOCATION SIMPLE: RIGHT FOREHEAD
LOCATION SIMPLE: LEFT FOREHEAD

## 2021-12-13 ASSESSMENT — LOCATION ZONE DERM
LOCATION ZONE: FACE
LOCATION ZONE: SCALP

## 2021-12-13 ASSESSMENT — LOCATION DETAILED DESCRIPTION DERM
LOCATION DETAILED: LEFT INFERIOR MEDIAL FOREHEAD
LOCATION DETAILED: LEFT SUPERIOR PARIETAL SCALP
LOCATION DETAILED: RIGHT MEDIAL FOREHEAD
LOCATION DETAILED: LEFT INFERIOR CENTRAL MALAR CHEEK
LOCATION DETAILED: LEFT MEDIAL FOREHEAD

## 2021-12-13 ASSESSMENT — SEVERITY ASSESSMENT OVERALL AMONG ALL PATIENTS: IN YOUR EXPERIENCE, AMONG ALL PATIENTS YOU HAVE SEEN WITH THIS CONDITION, HOW SEVERE IS THIS PATIENT'S CONDITION?: MILD

## 2021-12-13 ASSESSMENT — WOMENS ALOPECIA SEVERITY SCALE: PLEASE ASSSESS THE PATIENT'S MID SCALP ALOPECIA SEVERITY BY COMPARING IT TO THESE PHOTOS: WIDENED MIDLINE PART

## 2021-12-13 NOTE — PROCEDURE: TREATMENT REGIMEN
Detail Level: Zone
Continue Regimen: Finasteride 5mg 1 tablet daily.\\nSlowFE
Otc Regimen: Rogaine 5% foam
Plan: Dutasteride and low dose oral Minoxidil discussed with patient. Patient will contact office if she decides to initiate Dutasteride in place of Finasteride
Continue Regimen: Retin A micro 0.08% gel at night

## 2022-04-10 ENCOUNTER — HISTORICAL (OUTPATIENT)
Dept: ADMINISTRATIVE | Facility: HOSPITAL | Age: 62
End: 2022-04-10

## 2022-04-29 VITALS
DIASTOLIC BLOOD PRESSURE: 66 MMHG | HEIGHT: 67 IN | BODY MASS INDEX: 38.2 KG/M2 | WEIGHT: 243.38 LBS | SYSTOLIC BLOOD PRESSURE: 116 MMHG

## 2022-04-29 NOTE — H&P
Patient:   Angelique Martin             MRN: [id number]            FIN: [id number]               Age:   59 years     Sex:  Female     :  1960   Associated Diagnoses:   None   Author:   Alfredo Bonilla MD      Date of admission: 2019    Chief complaint: Nasal obstruction    History of present illness: 59-year-old female has a several year history of chronic nasal obstruction with the right side being worse than the left.  This is a perennial problem without any specific modifying factors.  At times the nasal obstruction will also occur on the left side.  She usually does not have any associated discolored nasal drainage but does have postnasal drainage.  She does not have any associated fever or headaches.  She did have a CT scan of the sinuses done on 2016, and that did show the presence of fairly severe right septal deviation with obstruction of the right nasal cavity.  She also had some bilateral inferior turbinate hypertrophy with the left side being greater than the right.  She had been treated with fluticasone nasal spray over the course of several months and did have partial improvement of her symptoms but not significant enough relief.  She is being admitted to the hospital at this time for septoplasty and bilateral inferior turbinectomy.  Of note she does not have a history of nasal trauma.    Past medical history: History of arthritis and hypothyroidism.  History of gastroesophageal reflux.    Past surgical history: Status post cholecystectomy, dilatation and curettage, tonsillectomy.    Medications: Progesterone 100 mg p.o. daily, levothyroxine 50 mcg p.o. daily, estradiol 1 mg p.o. daily, esomeprazole 40 mg p.o. every morning, aspirin 81 mg p.o. daily, vitamin E 400 units p.o. daily, multivitamin 1 p.o. daily, vitamin B12 1000 mcg IM q. weekly, cholecalciferol 50,000 units q. weekly, calcium carbonate 500 mg p.o. daily, glucosamine/chondroitin 1 tablet p.o.  daily.    Allergies: No known drug allergies    Review of systems: Unremarkable except as mentioned above.    Social history: Patient is  and employed.  She has never used tobacco.  She drinks alcohol occasionally.  No illicit drug use.    Family history: Unremarkable for bleeding diathesis or anesthetic complications.  There is a family history of heart disease, hypertension, cancer, thyroid disease, diabetes, and stroke.    Physical examination:  Vital signs: Weight 264 pounds, blood pressure 105/67, pulse 68, temperature 96.4 °F.  General: Well-developed well-nourished female in no acute distress voice is normal.  Head and face: Normocephalic without facial lesions.  Ears: External auditory canals are clear.  Tympanic membranes nonerythematous.  No middle ear effusions.  Nose: Nasal dorsum is unremarkable.  She does have right septal deviation with obstruction of the right nasal cavity.  Mild to moderate congestion with turbinate hypertrophy bilaterally.  Oral cavity and oropharynx: Tongue and floor mouth are unremarkable.  No tonsillar hypertrophy.  No pharyngeal erythema or exudate.  Neck: Supple without adenopathy or thyromegaly.  Trachea is in the midline.  Parotid and submandibular glands are normal to palpation.  Chest: Clear and without adventitious sounds.  Cardiovascular: Regular rate and rhythm without murmurs.  Abdomen: Nondistended.  Extremities: No cyanosis, clubbing, or edema.  Neurologic: Alert and oriented ×3.  Cranial nerves II through XII are grossly normal.    Lab: Metabolic profile within normal limits except for an AST of 12.  PT 9.8, PTT 28.9.  Hemoglobin 13.9, hematocrit 44.3, white blood cell count 7.3, platelets 335,000.    Impression: Nasal obstruction secondary to septal deviation and turbinate hypertrophy.    Plan: Patient is scheduled for septoplasty with possible turbinectomy on December 12, 2019.    CC: Dr. Stu Page

## 2022-04-29 NOTE — OP NOTE
DATE OF SURGERY:    05/30/2017    SURGEON:  Felton Rios MD    ASSISTANT:  None    PREOPERATIVE DIAGNOSES:    1. Low back pain.   2. Lumbar spinal stenosis.   3. Left lower extremity radiculopathy.   4. Lumbar degenerative disc disease.    POSTOPERATIVE DIAGNOSES:    1. Low back pain.   2. Lumbar spinal stenosis.   3. Left lower extremity radiculopathy.   4. Lumbar degenerative disc disease.    PROCEDURE:  L4-5 interlaminar epidural steroid injection.    INDICATION FOR PROCEDURE:  This is a 56-year-old female who presented to my clinic with complaint of back pain and left lower extremity radiculopathy extending down to her ankle region.  MRI of the lumbar spine noted spinal stenosis at L4-5.  The patient was counseled extensively on these findings and elected to undergo epidural steroid injection.    PROCEDURE IN DETAIL:  After informed consent was obtained, the patient was brought to the procedure room, placed prone on the procedure table and placed under anesthesia by the anesthesia team, appropriately padded and prepped and draped in the usual sterile fashion.       I began the procedure by under live AP and lateral fluoroscopy passing a 20 gauge Tuohy needle into the L4-5 interlaminar space using loss of resistance technique, I entered the epidural space.  Radiopaque contrast injection showed appropriate epidural flow pattern.  I then injected 4 ml of 4 mg per ml of     Decadron solution mixed with 1 ml of preservative free saline into the epidural space and needle was withdrawn with no apparent complications.        ______________________________  MD LILIANA Valdivia/JB  DD:  05/30/2017  Time:  10:26AM  DT:  05/30/2017  Time:  12:48PM  Job #:  [id number]

## 2022-04-29 NOTE — OP NOTE
Patient:   Angelique Martin             MRN: [id number]            FIN: [id number]               Age:   59 years     Sex:  Female     :  1960   Associated Diagnoses:   Nasal obstruction; Nasal septal deviation   Author:   Alfredo Bonilla MD      Operative Note   Operative Information   Date/ Time:  2019 11:48:00.     Procedures Performed: Procedure Code   Septoplasty (CPT4 74132) performed by Alfredo Bonilla MD on 2019 at 59 Years..     Preoperative Diagnosis: Nasal obstruction (FAC42-TD J34.89), Nasal septal deviation ([id number] J34.2).       Surgeon: Alfredo Bonilla M.D.    Findings: Patient had deviation of the nasal septum to the right with obstruction of the right nasal cavity.  On examination the patient did not appear to have significant turbinate hypertrophy.    Specimens: Septal cartilage and bone.     Estimated blood loss: Less than 25 cc    Complications: None    Drains: None    Anesthesia: General endotracheal anesthesia.    Indications: Please see history and physical exam    Procedure: The patient was brought to the operating room and placed on the operating room table in supine position after which general endotracheal anesthesia was induced.  The nasal septum was then infiltrated with 2% Xylocaine with 100,000 epinephrine for hemostasis.  Both inferior turbinates were also infiltrated.  Surgical patties saturated with 4% cocaine were then placed intranasally bilaterally also for hemostasis.  The patient was then prepped and draped.  5 minutes were allowed to elapse.  Examination of the nose with the nasal speculum showed the above-mentioned findings.  Surgical patties had been removed from the nose.  The septoplasty was addressed 1st.  A hemitransfixion incision was made on the right and a mucoperichondrial and periosteal flap was then elevated on that side.  An incision was then made in the septal cartilage anterior to the deviation.  A mucoperichondrial and periosteal flap  was then elevated on the opposite side.  A Rina swivel knife was then used to resect the deviated portions of the septal cartilage.  There was spurring noted along the maxillary crest which contributed to obstruction.  Further elevation of the mucoperiosteum was carried out over the maxillary crest and the maxillary crest was then resected with an osteotome.  Next the remaining attachments of the septal cartilage to the perpendicular plate of the ethmoid and vomer were divided using the Chantel elevator.  The deviated portions of the perpendicular plate of the ethmoid and vomer were then removed using a combination of duckbill forceps and punch forceps.  Care was taken to leave an adequate amount of cartilage for a caudal and dorsal strut.  The nose was then reexamined and found to be free of the obstruction from the the deviated septum.  The hemitransfixion incision was then closed using interrupted 5-0 chromic catgut.  The septal flaps were then reapproximated using a running 4-0 plain gut horizontal mattress suture.    After completion of the septoplasty the nasal turbinates were examined and the patient did not appear to have significant turbinate hypertrophy therefore turbinectomy was not performed as had been discussed with the patient previously.  Turbinates have been examined with a 0 degree 4 mm nasal endoscope and also with the use of a nasal speculum.    Next magnetic septal splints were placed and secured across columella.  Blood was evacuated from the nasopharynx and nasal cavity.  A drip pad was placed and the procedure terminated.  The patient was then awoken and brought to the recovery room in stable condition.  Patient tolerated the procedure well.    CC: Dr. Stu Page  .

## 2022-06-21 ENCOUNTER — APPOINTMENT (RX ONLY)
Age: 62
Setting detail: DERMATOLOGY
End: 2022-06-21

## 2022-06-21 DIAGNOSIS — D22 MELANOCYTIC NEVI: ICD-10-CM | Status: STABLE

## 2022-06-21 DIAGNOSIS — L71.8 OTHER ROSACEA: ICD-10-CM | Status: STABLE

## 2022-06-21 DIAGNOSIS — L82.1 OTHER SEBORRHEIC KERATOSIS: ICD-10-CM | Status: STABLE

## 2022-06-21 DIAGNOSIS — Z87.2 PERSONAL HISTORY OF DISEASES OF THE SKIN AND SUBCUTANEOUS TISSUE: ICD-10-CM | Status: STABLE

## 2022-06-21 DIAGNOSIS — L82.0 INFLAMED SEBORRHEIC KERATOSIS: ICD-10-CM

## 2022-06-21 PROBLEM — D48.5 NEOPLASM OF UNCERTAIN BEHAVIOR OF SKIN: Status: ACTIVE | Noted: 2022-06-21

## 2022-06-21 PROBLEM — D22.5 MELANOCYTIC NEVI OF TRUNK: Status: ACTIVE | Noted: 2022-06-21

## 2022-06-21 PROCEDURE — ? TREATMENT REGIMEN

## 2022-06-21 PROCEDURE — 99213 OFFICE O/P EST LOW 20 MIN: CPT | Mod: 25

## 2022-06-21 PROCEDURE — 11102 TANGNTL BX SKIN SINGLE LES: CPT

## 2022-06-21 PROCEDURE — ? BIOPSY BY SHAVE METHOD

## 2022-06-21 PROCEDURE — ? COUNSELING

## 2022-06-21 ASSESSMENT — LOCATION DETAILED DESCRIPTION DERM
LOCATION DETAILED: EPIGASTRIC SKIN
LOCATION DETAILED: LEFT INFERIOR CENTRAL MALAR CHEEK
LOCATION DETAILED: RIGHT INFERIOR MEDIAL UPPER BACK
LOCATION DETAILED: LEFT INFERIOR MEDIAL FOREHEAD
LOCATION DETAILED: RIGHT DISTAL CALF

## 2022-06-21 ASSESSMENT — SEVERITY ASSESSMENT OVERALL AMONG ALL PATIENTS: IN YOUR EXPERIENCE, AMONG ALL PATIENTS YOU HAVE SEEN WITH THIS CONDITION, HOW SEVERE IS THIS PATIENT'S CONDITION?: MILD

## 2022-06-21 ASSESSMENT — LOCATION SIMPLE DESCRIPTION DERM
LOCATION SIMPLE: RIGHT UPPER BACK
LOCATION SIMPLE: LEFT CHEEK
LOCATION SIMPLE: ABDOMEN
LOCATION SIMPLE: RIGHT CALF
LOCATION SIMPLE: LEFT FOREHEAD

## 2022-06-21 ASSESSMENT — LOCATION ZONE DERM
LOCATION ZONE: TRUNK
LOCATION ZONE: LEG
LOCATION ZONE: FACE

## 2022-06-21 NOTE — PROCEDURE: BIOPSY BY SHAVE METHOD
Anesthesia Volume In Cc: 0.5
Hide Additional Anticipated Plan?: No
Was A Bandage Applied: Yes
Dressing: bandage
Silver Nitrate Text: The wound bed was treated with silver nitrate after the biopsy was performed.
Biopsy Type: H and E
Additional Anesthesia Volume In Cc (Will Not Render If 0): 0
Electrodesiccation And Curettage Text: The wound bed was treated with electrodesiccation and curettage after the biopsy was performed.
Hemostasis: Electrocautery
Detail Level: Detailed
Type Of Destruction Used: Electrodesiccation and Curettage
Information: Selecting Yes will display possible errors in your note based on the variables you have selected. This validation is only offered as a suggestion for you. PLEASE NOTE THAT THE VALIDATION TEXT WILL BE REMOVED WHEN YOU FINALIZE YOUR NOTE. IF YOU WANT TO FAX A PRELIMINARY NOTE YOU WILL NEED TO TOGGLE THIS TO 'NO' IF YOU DO NOT WANT IT IN YOUR FAXED NOTE.
Electrodesiccation Text: The wound bed was treated with electrodesiccation after the biopsy was performed.
Size Of Lesion In Cm: 1.3
Consent: Verbal consent was obtained and risks were reviewed including but not limited to scarring, infection, bleeding, scabbing, incomplete removal, nerve damage and allergy to anesthesia.
Biopsy Method: Dermablade
Depth Of Biopsy: dermis
Notification Instructions: Patient will be notified of biopsy results. However, patient instructed to call the office if not contacted within 2 weeks.
Cryotherapy Text: The wound bed was treated with cryotherapy after the biopsy was performed.
Anesthesia Type: 2% lidocaine without epinephrine
Post-Care Instructions: I reviewed with the patient in detail post-care instructions. Patient is to keep the biopsy site dry overnight, and then apply bacitracin twice daily until healed. Patient may apply hydrogen peroxide soaks to remove any crusting.
Wound Care: Aquaphor
Billing Type: Third-Party Bill
Curettage Text: The wound bed was treated with curettage after the biopsy was performed.

## 2022-06-24 ENCOUNTER — RX ONLY (OUTPATIENT)
Age: 62
Setting detail: RX ONLY
End: 2022-06-24

## 2022-06-24 RX ORDER — FLUOROURACIL 5 MG/G
CREAM TOPICAL
Qty: 40 | Refills: 0 | Status: ERX | COMMUNITY
Start: 2022-06-24

## 2022-08-03 LAB — BCS RECOMMENDATION EXT: NORMAL

## 2022-10-31 ENCOUNTER — OFFICE VISIT (OUTPATIENT)
Dept: NEUROLOGY | Facility: CLINIC | Age: 62
End: 2022-10-31
Payer: COMMERCIAL

## 2022-10-31 VITALS
BODY MASS INDEX: 42.43 KG/M2 | HEART RATE: 70 BPM | HEIGHT: 66 IN | WEIGHT: 264 LBS | SYSTOLIC BLOOD PRESSURE: 140 MMHG | DIASTOLIC BLOOD PRESSURE: 90 MMHG

## 2022-10-31 DIAGNOSIS — R41.3 OTHER AMNESIA: Primary | ICD-10-CM

## 2022-10-31 PROCEDURE — 3080F PR MOST RECENT DIASTOLIC BLOOD PRESSURE >= 90 MM HG: ICD-10-PCS | Mod: CPTII,S$GLB,, | Performed by: PSYCHIATRY & NEUROLOGY

## 2022-10-31 PROCEDURE — 99204 PR OFFICE/OUTPT VISIT, NEW, LEVL IV, 45-59 MIN: ICD-10-PCS | Mod: S$GLB,,, | Performed by: PSYCHIATRY & NEUROLOGY

## 2022-10-31 PROCEDURE — 99999 PR PBB SHADOW E&M-EST. PATIENT-LVL IV: ICD-10-PCS | Mod: PBBFAC,,, | Performed by: PSYCHIATRY & NEUROLOGY

## 2022-10-31 PROCEDURE — 1159F PR MEDICATION LIST DOCUMENTED IN MEDICAL RECORD: ICD-10-PCS | Mod: CPTII,S$GLB,, | Performed by: PSYCHIATRY & NEUROLOGY

## 2022-10-31 PROCEDURE — 1160F RVW MEDS BY RX/DR IN RCRD: CPT | Mod: CPTII,S$GLB,, | Performed by: PSYCHIATRY & NEUROLOGY

## 2022-10-31 PROCEDURE — 3077F SYST BP >= 140 MM HG: CPT | Mod: CPTII,S$GLB,, | Performed by: PSYCHIATRY & NEUROLOGY

## 2022-10-31 PROCEDURE — 3080F DIAST BP >= 90 MM HG: CPT | Mod: CPTII,S$GLB,, | Performed by: PSYCHIATRY & NEUROLOGY

## 2022-10-31 PROCEDURE — 3077F PR MOST RECENT SYSTOLIC BLOOD PRESSURE >= 140 MM HG: ICD-10-PCS | Mod: CPTII,S$GLB,, | Performed by: PSYCHIATRY & NEUROLOGY

## 2022-10-31 PROCEDURE — 1160F PR REVIEW ALL MEDS BY PRESCRIBER/CLIN PHARMACIST DOCUMENTED: ICD-10-PCS | Mod: CPTII,S$GLB,, | Performed by: PSYCHIATRY & NEUROLOGY

## 2022-10-31 PROCEDURE — 1159F MED LIST DOCD IN RCRD: CPT | Mod: CPTII,S$GLB,, | Performed by: PSYCHIATRY & NEUROLOGY

## 2022-10-31 PROCEDURE — 99204 OFFICE O/P NEW MOD 45 MIN: CPT | Mod: S$GLB,,, | Performed by: PSYCHIATRY & NEUROLOGY

## 2022-10-31 PROCEDURE — 99999 PR PBB SHADOW E&M-EST. PATIENT-LVL IV: CPT | Mod: PBBFAC,,, | Performed by: PSYCHIATRY & NEUROLOGY

## 2022-10-31 RX ORDER — GLUCOSAMINE/CHONDRO SU A 500-400 MG
1 TABLET ORAL 2 TIMES DAILY
COMMUNITY

## 2022-10-31 RX ORDER — CALCIUM CARBONATE 200(500)MG
1 TABLET,CHEWABLE ORAL DAILY
COMMUNITY
End: 2023-11-15

## 2022-10-31 RX ORDER — MELOXICAM 15 MG/1
1 TABLET ORAL DAILY
COMMUNITY
End: 2023-03-14 | Stop reason: SDUPTHER

## 2022-10-31 RX ORDER — ESTRADIOL 1 MG/1
1 TABLET ORAL DAILY
COMMUNITY

## 2022-10-31 RX ORDER — ESOMEPRAZOLE MAGNESIUM 40 MG/1
1 CAPSULE, DELAYED RELEASE ORAL DAILY
COMMUNITY

## 2022-10-31 RX ORDER — CLINDAMYCIN PHOSPHATE 11.9 MG/ML
1 SOLUTION TOPICAL
COMMUNITY
End: 2023-11-15

## 2022-10-31 RX ORDER — FLUTICASONE PROPIONATE 50 MCG
1 SPRAY, SUSPENSION (ML) NASAL
COMMUNITY
End: 2023-11-15

## 2022-10-31 RX ORDER — HYDROCHLOROTHIAZIDE 25 MG/1
25 TABLET ORAL
COMMUNITY
End: 2023-11-15

## 2022-10-31 RX ORDER — CYCLOBENZAPRINE HCL 10 MG
1 TABLET ORAL
COMMUNITY
End: 2023-11-01

## 2022-10-31 RX ORDER — GABAPENTIN 300 MG/1
1 CAPSULE ORAL 3 TIMES DAILY PRN
COMMUNITY
End: 2023-11-15

## 2022-10-31 RX ORDER — CHOLECALCIFEROL (VITAMIN D3) 25 MCG
1000 TABLET ORAL DAILY
COMMUNITY

## 2022-10-31 RX ORDER — ASPIRIN 81 MG/1
1 TABLET ORAL DAILY
COMMUNITY

## 2022-10-31 RX ORDER — LEVOTHYROXINE SODIUM 50 UG/1
1 TABLET ORAL DAILY
COMMUNITY

## 2022-10-31 RX ORDER — ALBUTEROL SULFATE 90 UG/1
1 AEROSOL, METERED RESPIRATORY (INHALATION) DAILY PRN
COMMUNITY

## 2022-10-31 NOTE — PROGRESS NOTES
Chief Complaint   Patient presents with    Amnesia     NP: Referred by Dr. Ming Page for Neuro consult to evaluate for Amnesia: 5 years started having some forgetfulness and it is getting worse. Having difficulty with short term memory. Forgets names and places where she has been. Has to double check herself. Averages 8 hours of sleep at night. Lives spouse, still drives. Does get frustrated when she cannot. Does have some increase anxiety and a little depression.         This is a 62 y.o. female Referred by Dr. Ming Page for Neuro consult to evaluate for memory problems: 5 years started having some forgetfulness and it is getting worse.  She is here by herself today.  Having difficulty with short term memory. Forgets people's names, and forgets places where she has been.  Uses a GPS while driving and sometimes passes out places where she is going.  Has to double check herself all the time.  Previously was working as a  and stopped working in December.  Did not make any mistakes and there were no work performance issues but she felt like she was always double checking herself.  She feels like her  is having memory problems as well.  Concerned because her father had paranoid schizophrenia.    Medication List with Changes/Refills   Current Medications    ALBUTEROL (PROVENTIL/VENTOLIN HFA) 90 MCG/ACTUATION INHALER    Inhale 1 puff into the lungs daily as needed.    ASPIRIN (ECOTRIN) 81 MG EC TABLET    Take 1 tablet by mouth Daily.    CALCIUM CARBONATE (TUMS) 200 MG CALCIUM (500 MG) CHEWABLE TABLET    Take 1 tablet by mouth once daily.    CLINDAMYCIN (CLEOCIN T) 1 % EXTERNAL SOLUTION    Apply 1 drop topically as needed.    CYCLOBENZAPRINE (FLEXERIL) 10 MG TABLET    Take 1 tablet by mouth as needed.    ESOMEPRAZOLE (NEXIUM) 40 MG CAPSULE    Take 1 capsule by mouth Daily.    ESTRADIOL (ESTRACE) 1 MG TABLET    Take 1 tablet by mouth Daily.    FLUTICASONE PROPIONATE (FLONASE) 50 MCG/ACTUATION NASAL  SPRAY    1 spray by Nasal route as needed.    GABAPENTIN (NEURONTIN) 300 MG CAPSULE    Take 1 capsule by mouth as needed.    GLUCOSAMINE-CHONDROITIN 500-400 MG TABLET    Take 2 tablets by mouth once daily.    HYDROCHLOROTHIAZIDE (HYDRODIURIL) 25 MG TABLET    Take 25 mg by mouth as needed.    LEVOTHYROXINE (SYNTHROID) 50 MCG TABLET    Take 1 tablet by mouth Daily.    MELOXICAM (MOBIC) 15 MG TABLET    Take 1 tablet by mouth Daily.    MULTIVITAMIN CAPSULE    Take 1 capsule by mouth once daily.    VITAMIN D (VITAMIN D3) 1000 UNITS TAB    Take 1,000 Units by mouth Daily.        Past Surgical History:   Procedure Laterality Date    CHOLECYSTECTOMY      DILATION AND CURETTAGE OF UTERUS      HYSTERECTOMY      Left total knee repair      SINUS SURGERY      TONSILLECTOMY      Uterine ablation          Past Medical History:   Diagnosis Date    Anxiety disorder, unspecified     Bronchial asthma     CAD (coronary artery disease)     Depression     Hiatal hernia     Rheumatoid arthritis, unspecified     Thyroid disease         Family History   Problem Relation Age of Onset    Heart disease Mother     Hypertension Mother     Arthritis Mother     Paranoid behavior Father     Cancer Sister     Depression Sister     Anxiety disorder Sister     Arthritis Brother         Social History     Socioeconomic History    Marital status:    Tobacco Use    Smoking status: Never    Smokeless tobacco: Never   Substance and Sexual Activity    Alcohol use: Yes     Alcohol/week: 3.0 standard drinks     Types: 1 Glasses of wine, 1 Cans of beer, 1 Shots of liquor per week     Comment: Weekly    Drug use: Never          Review of Systems  Review of Systems   Constitutional: Negative for appetite change.   HENT: Negative for sinus pressure and sore throat.    Eyes: Negative for visual disturbance.   Respiratory: Negative for cough and shortness of breath.    Cardiovascular: Negative for chest pain.   Gastrointestinal: Negative for diarrhea and  nausea.   Endocrine: Negative for cold intolerance and heat intolerance.   Genitourinary: Negative for dysuria.   Musculoskeletal: Negative for arthralgias and myalgias.   Skin: Negative for rash.   Allergic/Immunologic: Negative for immunocompromised state.   Neurological:        See HPI   Hematological: Does not bruise/bleed easily.   Psychiatric/Behavioral: Negative for hallucinations.      General: alert and oriented, no acute distress, no audible wheezes, pulse intact, no edema    Vitals:    10/31/22 0937   BP: (!) 140/90   Pulse: 70        Cognition and Comprehension  Speech and language intact  Follows commands  Speech fluent  Attention intact  Memory for recent events intact from history taking  Affect pleasant  Fund of knowledge adequate    MOCA 30/30    Cranial nerves  II. Optic: Visual fields full to confrontation both eyes  III, IV, VI. Oculomotor: Intact, Pupils equal, round and reactive to light, no nystagmus  V. Trigeminal: sensation to light touch normal  VII. No facial asymmetry or facial weakness  VIII. Hearing intact to spoken voice  IX/X. Glossopharyngeal/Vagus: Voice normal, palate rises symmetrically  XI. Axillary: Shoulder shrug normal  XII. Hypoglossal: Intact    Muscle Strength and Tone  Normal upper extremity tone  Normal lower extremity tone  Normal upper extremity strength  Normal lower extremity strength    Sensation  Intact to light touch and temperature    Reflexes  Normal and symmetric    Coordination and Gait  Finger to nose normal  Gait normal       Angelique was seen today for amnesia.    Diagnoses and all orders for this visit:    Other amnesia  -     MRI Brain Without Contrast; Future  -     TSH; Future  -     Vitamin B12; Future     Self-reported cognitive changes,  is not present there is no ancillary history and patient scores 30/30 on her memory testing.  Most likely symptoms are due to age-related memory loss and difficulty with attention and focus.  Patient admits that  when she does focus on a task she does not have difficulty and when she is paying attention she will remember most times.  Plan to go ahead with workup for memory loss including TSH/B12/MRI.  Return to clinic 1 year

## 2022-11-01 ENCOUNTER — TELEPHONE (OUTPATIENT)
Dept: NEUROLOGY | Facility: CLINIC | Age: 62
End: 2022-11-01
Payer: COMMERCIAL

## 2022-11-01 DIAGNOSIS — E53.8 B12 DEFICIENCY: Primary | ICD-10-CM

## 2022-11-01 NOTE — PROGRESS NOTES
[facility] 10/31/2022   Alternating [place] Making 1 - correct   Cube 1 - correct   Clock Contour 1 - correct   Clock Numbers 1 - correct   Clock Hands 1 - correct   Lion 1 - correct   Rhino 1 - correct   Camel 1 - correct   Face Yes   Velvet Yes   Adventism Yes   Gretchen Yes   Red Yes   Face Yes   Velvet Yes   Adventism Yes   Gretchen Yes   Red Yes   Forward Order - 2 1 8 5 4 1 - correct   Attention - Backward Order 1 - correct   Letters 1 - correct   Numbers 3 - 4 or 5 correct   Repeat - Kwan 1 - correct   Repeat - Cat 1 - correct   Fluency  1 - correct   Train - Bicycle 1 - correct   Watch-Ruler 1 - correct   Face 1 - correct   Velvet 1 - correct   Adventism 1 - correct   Gretchen 1 - correct   Red 1 - correct   Date 1 - correct   Month 1 - correct   Year 1 - correct   Day 1 - correct   Place 1 - correct   City 1 - correct   Add 1 point if less than or equal to 12 yr edu 0 - incorrect   Total Score 30

## 2022-11-01 NOTE — TELEPHONE ENCOUNTER
----- Message from Amber Wasserman MD sent at 10/31/2022  4:01 PM CDT -----  Vitamin B12 is relatively low. We would recommend that you supplement with oral vitamin B12 2000 mcg daily. This can be found over the counter.

## 2022-11-01 NOTE — PROGRESS NOTES
[facility] 10/31/2022   Alternating [place] Making 1 - correct   Cube 1 - correct   Clock Contour 1 - correct   Clock Numbers 1 - correct   Clock Hands 1 - correct   Lion 1 - correct   Rhino 1 - correct   Camel 1 - correct   Face Yes   Velvet Yes   Synagogue Yes   Gretchen Yes   Red Yes   Face Yes   Velvet Yes   Synagogue Yes   Gretchen Yes   Red Yes   Forward Order - 2 1 8 5 4 1 - correct   Attention - Backward Order 1 - correct   Letters 1 - correct   Numbers 3 - 4 or 5 correct   Repeat - Kwan 1 - correct   Repeat - Cat 1 - correct   Fluency  1 - correct   Train - Bicycle 1 - correct   Watch-Ruler 1 - correct   Face 1 - correct   Velvet 1 - correct   Synagogue 1 - correct   Gretchen 1 - correct   Red 1 - correct   Date 1 - correct   Month 1 - correct   Year 1 - correct   Day 1 - correct   Place 1 - correct   City 1 - correct   Add 1 point if less than or equal to 12 yr edu 0 - incorrect   Total Score 30

## 2022-11-02 RX ORDER — CYANOCOBALAMIN 1000 UG/ML
INJECTION, SOLUTION INTRAMUSCULAR; SUBCUTANEOUS
Qty: 30 ML | Refills: 1 | Status: SHIPPED | OUTPATIENT
Start: 2022-11-02

## 2022-11-10 ENCOUNTER — OFFICE VISIT (OUTPATIENT)
Dept: FAMILY MEDICINE | Facility: CLINIC | Age: 62
End: 2022-11-10
Payer: COMMERCIAL

## 2022-11-10 ENCOUNTER — TELEPHONE (OUTPATIENT)
Dept: NEUROLOGY | Facility: CLINIC | Age: 62
End: 2022-11-10
Payer: COMMERCIAL

## 2022-11-10 VITALS
HEIGHT: 63 IN | BODY MASS INDEX: 47.45 KG/M2 | RESPIRATION RATE: 16 BRPM | SYSTOLIC BLOOD PRESSURE: 137 MMHG | WEIGHT: 267.81 LBS | OXYGEN SATURATION: 98 % | TEMPERATURE: 98 F | DIASTOLIC BLOOD PRESSURE: 83 MMHG | HEART RATE: 64 BPM

## 2022-11-10 DIAGNOSIS — Z01.818 PRE-OP EVALUATION: Primary | ICD-10-CM

## 2022-11-10 DIAGNOSIS — M19.90 ARTHRITIS: ICD-10-CM

## 2022-11-10 PROBLEM — M51.36 DEGENERATION OF INTERVERTEBRAL DISC OF LUMBAR REGION: Status: ACTIVE | Noted: 2022-11-10

## 2022-11-10 PROBLEM — M54.2 CERVICALGIA: Status: ACTIVE | Noted: 2022-11-10

## 2022-11-10 PROBLEM — M81.0 OSTEOPOROSIS: Status: ACTIVE | Noted: 2022-11-10

## 2022-11-10 PROBLEM — F41.9 ANXIETY: Status: ACTIVE | Noted: 2022-11-10

## 2022-11-10 PROBLEM — F32.A DEPRESSIVE DISORDER: Status: ACTIVE | Noted: 2022-11-10

## 2022-11-10 PROBLEM — E66.01 MORBID OBESITY: Status: ACTIVE | Noted: 2022-11-10

## 2022-11-10 PROBLEM — K21.9 GASTROESOPHAGEAL REFLUX DISEASE WITHOUT ESOPHAGITIS: Status: ACTIVE | Noted: 2022-11-10

## 2022-11-10 PROBLEM — R06.02 SHORTNESS OF BREATH: Status: ACTIVE | Noted: 2022-11-10

## 2022-11-10 PROBLEM — J30.2 SEASONAL ALLERGIES: Status: ACTIVE | Noted: 2022-11-10

## 2022-11-10 PROBLEM — E03.9 HYPOTHYROIDISM: Status: ACTIVE | Noted: 2018-12-06

## 2022-11-10 PROBLEM — K44.9 HIATAL HERNIA: Status: ACTIVE | Noted: 2022-11-10

## 2022-11-10 PROBLEM — M51.369 DEGENERATION OF INTERVERTEBRAL DISC OF LUMBAR REGION: Status: ACTIVE | Noted: 2022-11-10

## 2022-11-10 PROBLEM — S22.000A COMPRESSION FRACTURE OF THORACIC VERTEBRA: Status: ACTIVE | Noted: 2022-11-10

## 2022-11-10 PROBLEM — R07.9 CHEST PAIN: Status: ACTIVE | Noted: 2022-06-03

## 2022-11-10 PROCEDURE — 99203 OFFICE O/P NEW LOW 30 MIN: CPT | Mod: ,,, | Performed by: FAMILY MEDICINE

## 2022-11-10 PROCEDURE — 3008F BODY MASS INDEX DOCD: CPT | Mod: CPTII,,, | Performed by: FAMILY MEDICINE

## 2022-11-10 PROCEDURE — 3075F SYST BP GE 130 - 139MM HG: CPT | Mod: CPTII,,, | Performed by: FAMILY MEDICINE

## 2022-11-10 PROCEDURE — 1159F PR MEDICATION LIST DOCUMENTED IN MEDICAL RECORD: ICD-10-PCS | Mod: CPTII,,, | Performed by: FAMILY MEDICINE

## 2022-11-10 PROCEDURE — 3008F PR BODY MASS INDEX (BMI) DOCUMENTED: ICD-10-PCS | Mod: CPTII,,, | Performed by: FAMILY MEDICINE

## 2022-11-10 PROCEDURE — 1160F RVW MEDS BY RX/DR IN RCRD: CPT | Mod: CPTII,,, | Performed by: FAMILY MEDICINE

## 2022-11-10 PROCEDURE — 99203 PR OFFICE/OUTPT VISIT, NEW, LEVL III, 30-44 MIN: ICD-10-PCS | Mod: ,,, | Performed by: FAMILY MEDICINE

## 2022-11-10 PROCEDURE — 1159F MED LIST DOCD IN RCRD: CPT | Mod: CPTII,,, | Performed by: FAMILY MEDICINE

## 2022-11-10 PROCEDURE — 1160F PR REVIEW ALL MEDS BY PRESCRIBER/CLIN PHARMACIST DOCUMENTED: ICD-10-PCS | Mod: CPTII,,, | Performed by: FAMILY MEDICINE

## 2022-11-10 PROCEDURE — 3079F PR MOST RECENT DIASTOLIC BLOOD PRESSURE 80-89 MM HG: ICD-10-PCS | Mod: CPTII,,, | Performed by: FAMILY MEDICINE

## 2022-11-10 PROCEDURE — 3079F DIAST BP 80-89 MM HG: CPT | Mod: CPTII,,, | Performed by: FAMILY MEDICINE

## 2022-11-10 PROCEDURE — 3075F PR MOST RECENT SYSTOLIC BLOOD PRESS GE 130-139MM HG: ICD-10-PCS | Mod: CPTII,,, | Performed by: FAMILY MEDICINE

## 2022-11-10 NOTE — PROGRESS NOTES
Patient ID: [id number]     Chief Complaint: Establish Care (Previous PCP Dr Page) and Pre-op Exam (Sx clearance for R TKR with Dr DAVID Person @ [facility])        HPI:     Angelique Martin is a 62 y.o. female here today for Establish Care (Previous PCP Dr Page) and Pre-op Exam (Sx clearance for R TKR with Dr DAVID Person @ [facility]). Has plans to go on [place] later this month. Doing well overall.         ----------------------------  Anxiety disorder, unspecified  Bronchial asthma  CAD (coronary artery disease)  Depression  Family history of ovarian cancer      Comment:  sister  GERD (gastroesophageal reflux disease)  Hiatal hernia  History of pressure ulcer  Hypothyroidism, unspecified  Osteoarthritis of knee  Personal history of colonic polyps      Comment:  s/p polypectomy - Dr Santa Howell, III  Postmenopausal state  Rheumatoid arthritis, unspecified  Thyroid disease  Vitamin D deficiency     Past Surgical History:   Procedure Laterality Date    ABLATION, FIBROID, UTERUS, LAPAROSCOPIC, WITH US GUIDANCE  2019    Dr Tyler Farrell    CHOLECYSTECTOMY  2010    COLONOSCOPY W/ BIOPSIES AND POLYPECTOMY  05/12/2020    Dr Santa Howell, III    DILATION AND CURETTAGE OF UTERUS  1981    Dr Tyler Farrell    ESOPHAGOGASTRODUODENOSCOPY  04/25/2019    Dr Santa Howell, III    HYSTERECTOMY, TOTAL, LAPAROSCOPIC, WITH SALPINGO-OOPHORECTOMY Bilateral 04/2022    Dr Tyler Farrell    SINUS SURGERY  2018    TONSILLECTOMY  1972    TOTAL KNEE ARTHROPLASTY Left 12/28/2021    Dr Abdelrahman Person    TUBAL LIGATION Bilateral 1988    Dr Tyler Farrell       Review of patient's allergies indicates:  No Known Allergies    Outpatient Medications Marked as Taking for the 11/10/22 encounter (Office Visit) with Tadeo Ambriz DO   Medication Sig Dispense Refill    albuterol (PROVENTIL/VENTOLIN HFA) 90 mcg/actuation inhaler Inhale 1 puff into the lungs daily as needed.      aspirin (ECOTRIN) 81 MG EC tablet Take 1 tablet by mouth Daily.      calcium carbonate  (CALCIUM 500 ORAL) Take 1 tablet by mouth once daily.      calcium carbonate (TUMS) 200 mg calcium (500 mg) chewable tablet Take 1 tablet by mouth once daily.      clindamycin (CLEOCIN T) 1 % external solution Apply 1 drop topically as needed.      cyanocobalamin 1,000 mcg/mL injection 1 mL sc daily for 7 days, then weekly for 1 month, then monthly for 6 months 30 mL 1    cyclobenzaprine (FLEXERIL) 10 MG tablet Take 1 tablet by mouth as needed.      esomeprazole (NEXIUM) 40 MG capsule Take 1 capsule by mouth Daily.      estradioL (ESTRACE) 1 MG tablet Take 1 tablet by mouth Daily.      fluticasone propionate (FLONASE) 50 mcg/actuation nasal spray 1 spray by Nasal route as needed.      gabapentin (NEURONTIN) 300 MG capsule Take 1 capsule by mouth 3 (three) times daily as needed (pain).      glucosamine-chondroitin 500-400 mg tablet Take 2 tablets by mouth once daily.      hydroCHLOROthiazide (HYDRODIURIL) 25 MG tablet Take 25 mg by mouth as needed.      levothyroxine (SYNTHROID) 50 MCG tablet Take 1 tablet by mouth Daily.      meloxicam (MOBIC) 15 MG tablet Take 1 tablet by mouth Daily.      multivitamin capsule Take 1 capsule by mouth once daily.      vitamin D (VITAMIN D3) 1000 units Tab Take 1,000 Units by mouth Daily.         Social History     Socioeconomic History    Marital status:    Tobacco Use    Smoking status: Never    Smokeless tobacco: Never   Substance and Sexual Activity    Alcohol use: Yes     Alcohol/week: 3.0 standard drinks     Types: 1 Glasses of wine, 1 Cans of beer, 1 Shots of liquor per week     Comment: Weekly    Drug use: Never    Sexual activity: Not Currently     Birth control/protection: Post-menopausal, See Surgical Hx        Family History   Problem Relation Age of Onset    Heart disease Mother     Hypertension Mother     Arthritis Mother     Paranoid behavior Father     Pneumonia Father     Ovarian cancer Sister     Depression Sister     Anxiety disorder Sister     Arthritis  "Brother         Patient Care Team:  Tadeo Ambriz DO as PCP - General (Family Medicine)  Santa Howell III, MD as Consulting Physician (Gastroenterology)  Kendall Vuong MD as Consulting Physician (Cardiology)  DAVID Person MD (Orthopedic Surgery)  Tyler Farrell MD as Consulting Physician (Obstetrics and Gynecology)  Maureen Gaming NP (Pulmonary Disease)  Amber Wasserman MD as Consulting Physician (Neurology)     Subjective:     Review of Systems   Constitutional:  Negative for chills and fever.   Respiratory:  Negative for shortness of breath.    Cardiovascular:  Negative for chest pain and palpitations.   Gastrointestinal:  Positive for heartburn. Negative for abdominal pain, blood in stool, constipation, diarrhea, melena, nausea and vomiting.   Musculoskeletal:  Positive for back pain and joint pain.   Neurological:  Negative for dizziness, tingling and headaches.     See HPI for details  All Other ROS: Negative except as stated in HPI.       Objective:     /83   Pulse 64   Temp 98.2 °F (36.8 °C) (Tympanic)   Resp 16   Ht 5' 2.99" (1.6 m)   Wt 121.5 kg (267 lb 12.8 oz)   SpO2 98%   BMI 47.45 kg/m²     Physical Exam  Vitals reviewed.   Constitutional:       General: She is not in acute distress.     Appearance: Normal appearance.   Cardiovascular:      Rate and Rhythm: Normal rate and regular rhythm.      Heart sounds: No murmur heard.    No friction rub. No gallop.   Pulmonary:      Effort: No respiratory distress.      Breath sounds: No wheezing, rhonchi or rales.   Skin:     General: Skin is warm and dry.      Findings: No lesion or rash.   Neurological:      General: No focal deficit present.      Mental Status: She is alert.   Psychiatric:         Mood and Affect: Mood normal.       Assessment/Plan:     1. Pre-op evaluation    2. Arthritis      -Patient cleared for surgery. CBC, CMP, PT/PTT/INR, HA1C, EKG, and Chest-xray to be done in hospital during pre-registration. If this " workup is required sooner will order the above. Advised patient to hold her aspirin 7 days prior to surgery.   Follow up:     No follow-ups on file. In addition to their scheduled follow up, the patient has also been instructed to follow up on as needed basis.

## 2022-11-10 NOTE — TELEPHONE ENCOUNTER
----- Message from Amber Wasserman MD sent at 11/10/2022  4:00 PM CST -----  Let patient know that MRI shows age related changes only. No stroke or mass present. Continue with plan of care as discussed at last visit

## 2023-03-14 DIAGNOSIS — M51.36 DEGENERATION OF INTERVERTEBRAL DISC OF LUMBAR REGION: Primary | ICD-10-CM

## 2023-03-14 RX ORDER — MELOXICAM 15 MG/1
15 TABLET ORAL DAILY
Qty: 30 TABLET | Refills: 2 | Status: SHIPPED | OUTPATIENT
Start: 2023-03-14 | End: 2023-06-07

## 2023-04-18 ENCOUNTER — LAB REQUISITION (OUTPATIENT)
Dept: LAB | Facility: HOSPITAL | Age: 63
End: 2023-04-18
Payer: COMMERCIAL

## 2023-04-18 DIAGNOSIS — R19.7 DIARRHEA, UNSPECIFIED: ICD-10-CM

## 2023-04-18 LAB

## 2023-04-18 PROCEDURE — 87507 IADNA-DNA/RNA PROBE TQ 12-25: CPT | Performed by: INTERNAL MEDICINE

## 2023-04-25 ENCOUNTER — LAB VISIT (OUTPATIENT)
Dept: LAB | Facility: HOSPITAL | Age: 63
End: 2023-04-25
Attending: INTERNAL MEDICINE
Payer: COMMERCIAL

## 2023-04-25 DIAGNOSIS — R19.7 DIARRHEA OF PRESUMED INFECTIOUS ORIGIN: ICD-10-CM

## 2023-04-25 DIAGNOSIS — Z83.719 FAMILY HISTORY OF COLONIC POLYPS: Primary | ICD-10-CM

## 2023-04-25 LAB
ALBUMIN SERPL-MCNC: 3.6 G/DL (ref 3.4–4.8)
ALBUMIN/GLOB SERPL: 1.1 RATIO (ref 1.1–2)
ALP SERPL-CCNC: 70 UNIT/L (ref 40–150)
ALT SERPL-CCNC: 12 UNIT/L (ref 0–55)
AST SERPL-CCNC: 11 UNIT/L (ref 5–34)
BASOPHILS # BLD AUTO: 0.03 X10(3)/MCL (ref 0–0.2)
BASOPHILS NFR BLD AUTO: 0.4 %
BILIRUBIN DIRECT+TOT PNL SERPL-MCNC: 0.5 MG/DL
BUN SERPL-MCNC: 15 MG/DL (ref 9.8–20.1)
CALCIUM SERPL-MCNC: 9.8 MG/DL (ref 8.4–10.2)
CHLORIDE SERPL-SCNC: 108 MMOL/L (ref 98–107)
CO2 SERPL-SCNC: 26 MMOL/L (ref 23–31)
CREAT SERPL-MCNC: 0.74 MG/DL (ref 0.55–1.02)
EOSINOPHIL # BLD AUTO: 0.1 X10(3)/MCL (ref 0–0.9)
EOSINOPHIL NFR BLD AUTO: 1.4 %
ERYTHROCYTE [DISTWIDTH] IN BLOOD BY AUTOMATED COUNT: 15.2 % (ref 11.5–17)
GFR SERPLBLD CREATININE-BSD FMLA CKD-EPI: >60 MLS/MIN/1.73/M2
GLOBULIN SER-MCNC: 3.4 GM/DL (ref 2.4–3.5)
GLUCOSE SERPL-MCNC: 95 MG/DL (ref 82–115)
HCT VFR BLD AUTO: 40.1 % (ref 37–47)
HGB BLD-MCNC: 12.6 G/DL (ref 12–16)
IMM GRANULOCYTES # BLD AUTO: 0.01 X10(3)/MCL (ref 0–0.04)
IMM GRANULOCYTES NFR BLD AUTO: 0.1 %
LYMPHOCYTES # BLD AUTO: 1.07 X10(3)/MCL (ref 0.6–4.6)
LYMPHOCYTES NFR BLD AUTO: 15 %
MCH RBC QN AUTO: 27.3 PG (ref 27–31)
MCHC RBC AUTO-ENTMCNC: 31.4 G/DL (ref 33–36)
MCV RBC AUTO: 87 FL (ref 80–94)
MONOCYTES # BLD AUTO: 0.59 X10(3)/MCL (ref 0.1–1.3)
MONOCYTES NFR BLD AUTO: 8.3 %
NEUTROPHILS # BLD AUTO: 5.34 X10(3)/MCL (ref 2.1–9.2)
NEUTROPHILS NFR BLD AUTO: 74.8 %
NRBC BLD AUTO-RTO: 0 %
PLATELET # BLD AUTO: 378 X10(3)/MCL (ref 130–400)
PMV BLD AUTO: 8.8 FL (ref 7.4–10.4)
POTASSIUM SERPL-SCNC: 3.9 MMOL/L (ref 3.5–5.1)
PROT SERPL-MCNC: 7 GM/DL (ref 5.8–7.6)
RBC # BLD AUTO: 4.61 X10(6)/MCL (ref 4.2–5.4)
SODIUM SERPL-SCNC: 144 MMOL/L (ref 136–145)
WBC # SPEC AUTO: 7.1 X10(3)/MCL (ref 4.5–11.5)

## 2023-04-25 PROCEDURE — 80053 COMPREHEN METABOLIC PANEL: CPT

## 2023-04-25 PROCEDURE — 85025 COMPLETE CBC W/AUTO DIFF WBC: CPT

## 2023-04-25 PROCEDURE — 36415 COLL VENOUS BLD VENIPUNCTURE: CPT

## 2023-05-02 ENCOUNTER — DOCUMENTATION ONLY (OUTPATIENT)
Dept: FAMILY MEDICINE | Facility: CLINIC | Age: 63
End: 2023-05-02
Payer: COMMERCIAL

## 2023-05-02 LAB — CRC RECOMMENDATION EXT: NORMAL

## 2023-05-04 ENCOUNTER — PATIENT OUTREACH (OUTPATIENT)
Dept: ADMINISTRATIVE | Facility: HOSPITAL | Age: 63
End: 2023-05-04
Payer: COMMERCIAL

## 2023-05-04 NOTE — PROGRESS NOTES
Population Health. Out Reach. Reviewing patient's chart for quality metrics. Records request sent to Dr. Howell's office for colonoscopy.

## 2023-05-15 ENCOUNTER — OFFICE VISIT (OUTPATIENT)
Dept: FAMILY MEDICINE | Facility: CLINIC | Age: 63
End: 2023-05-15
Payer: COMMERCIAL

## 2023-05-15 VITALS
WEIGHT: 256.19 LBS | OXYGEN SATURATION: 98 % | HEART RATE: 72 BPM | DIASTOLIC BLOOD PRESSURE: 70 MMHG | TEMPERATURE: 98 F | BODY MASS INDEX: 45.39 KG/M2 | RESPIRATION RATE: 18 BRPM | SYSTOLIC BLOOD PRESSURE: 107 MMHG | HEIGHT: 63 IN

## 2023-05-15 DIAGNOSIS — E66.01 MORBID OBESITY: ICD-10-CM

## 2023-05-15 DIAGNOSIS — Z00.00 ROUTINE GENERAL MEDICAL EXAMINATION AT A HEALTH CARE FACILITY: Primary | ICD-10-CM

## 2023-05-15 DIAGNOSIS — R19.7 DIARRHEA, UNSPECIFIED TYPE: ICD-10-CM

## 2023-05-15 PROCEDURE — 3008F PR BODY MASS INDEX (BMI) DOCUMENTED: ICD-10-PCS | Mod: CPTII,,, | Performed by: FAMILY MEDICINE

## 2023-05-15 PROCEDURE — 1159F MED LIST DOCD IN RCRD: CPT | Mod: CPTII,,, | Performed by: FAMILY MEDICINE

## 2023-05-15 PROCEDURE — 1160F RVW MEDS BY RX/DR IN RCRD: CPT | Mod: CPTII,,, | Performed by: FAMILY MEDICINE

## 2023-05-15 PROCEDURE — 99396 PREV VISIT EST AGE 40-64: CPT | Mod: ,,, | Performed by: FAMILY MEDICINE

## 2023-05-15 PROCEDURE — 1159F PR MEDICATION LIST DOCUMENTED IN MEDICAL RECORD: ICD-10-PCS | Mod: CPTII,,, | Performed by: FAMILY MEDICINE

## 2023-05-15 PROCEDURE — 3074F PR MOST RECENT SYSTOLIC BLOOD PRESSURE < 130 MM HG: ICD-10-PCS | Mod: CPTII,,, | Performed by: FAMILY MEDICINE

## 2023-05-15 PROCEDURE — 3078F PR MOST RECENT DIASTOLIC BLOOD PRESSURE < 80 MM HG: ICD-10-PCS | Mod: CPTII,,, | Performed by: FAMILY MEDICINE

## 2023-05-15 PROCEDURE — 3008F BODY MASS INDEX DOCD: CPT | Mod: CPTII,,, | Performed by: FAMILY MEDICINE

## 2023-05-15 PROCEDURE — 1160F PR REVIEW ALL MEDS BY PRESCRIBER/CLIN PHARMACIST DOCUMENTED: ICD-10-PCS | Mod: CPTII,,, | Performed by: FAMILY MEDICINE

## 2023-05-15 PROCEDURE — 99396 PR PREVENTIVE VISIT,EST,40-64: ICD-10-PCS | Mod: ,,, | Performed by: FAMILY MEDICINE

## 2023-05-15 PROCEDURE — 3074F SYST BP LT 130 MM HG: CPT | Mod: CPTII,,, | Performed by: FAMILY MEDICINE

## 2023-05-15 PROCEDURE — 3078F DIAST BP <80 MM HG: CPT | Mod: CPTII,,, | Performed by: FAMILY MEDICINE

## 2023-05-15 RX ORDER — DIPHENOXYLATE HYDROCHLORIDE AND ATROPINE SULFATE 2.5; .025 MG/1; MG/1
1 TABLET ORAL EVERY 6 HOURS PRN
COMMUNITY
Start: 2023-05-12 | End: 2023-11-15

## 2023-05-15 RX ORDER — BUDESONIDE AND FORMOTEROL FUMARATE DIHYDRATE 160; 4.5 UG/1; UG/1
2 AEROSOL RESPIRATORY (INHALATION) 2 TIMES DAILY
COMMUNITY
Start: 2023-02-23 | End: 2023-11-01

## 2023-05-15 NOTE — PROGRESS NOTES
Patient ID: [id number]     Chief Complaint: Annual Exam        HPI:     Angelique Martin is a 62 y.o. female here today for an annual wellness visit. Reviewed and discussed lab results. Overall she feels well. No other complaints today.         ----------------------------  Anxiety disorder, unspecified  Bronchial asthma  CAD (coronary artery disease)  Depression  Family history of ovarian cancer      Comment:  sister  GERD (gastroesophageal reflux disease)  Hiatal hernia  History of pressure ulcer  Hypothyroidism, unspecified  Osteoarthritis of knee  Personal history of colonic polyps      Comment:  s/p polypectomy - Dr Santa Howell, III  Postmenopausal state  Rheumatoid arthritis, unspecified  Thyroid disease  Vitamin D deficiency     Past Surgical History:   Procedure Laterality Date    ABLATION, FIBROID, UTERUS, LAPAROSCOPIC, WITH US GUIDANCE  2019    Dr Tyler Farrell    CHOLECYSTECTOMY  2010    COLONOSCOPY W/ BIOPSIES AND POLYPECTOMY  05/12/2020    Dr Santa Howell, III    COLONOSCOPY W/ BIOPSIES AND POLYPECTOMY  05/02/2023    Dr Santa Howell, III    DILATION AND CURETTAGE OF UTERUS  1981    Dr Tyler Farrell    ESOPHAGOGASTRODUODENOSCOPY  04/25/2019    Dr Santa Howell, III    HYSTERECTOMY, TOTAL, LAPAROSCOPIC, WITH SALPINGO-OOPHORECTOMY Bilateral 04/2022    Dr Tyler Farrell    SINUS SURGERY  2018    TONSILLECTOMY  1972    TOTAL KNEE ARTHROPLASTY Left 12/28/2021    Dr Abdelrahman Person    TUBAL LIGATION Bilateral 1988    Dr Tyler Farrell       Review of patient's allergies indicates:  No Known Allergies    Outpatient Medications Marked as Taking for the 5/15/23 encounter (Office Visit) with Tadeo Ambriz,    Medication Sig Dispense Refill    albuterol (PROVENTIL/VENTOLIN HFA) 90 mcg/actuation inhaler Inhale 1 puff into the lungs daily as needed.      aspirin (ECOTRIN) 81 MG EC tablet Take 1 tablet by mouth Daily.      budesonide-formoterol 160-4.5 mcg (SYMBICORT) 160-4.5 mcg/actuation HFAA Inhale 2 puffs  into the lungs 2 (two) times daily.      calcium carbonate (CALCIUM 500 ORAL) Take 1 tablet by mouth once daily.      calcium carbonate (TUMS) 200 mg calcium (500 mg) chewable tablet Take 1 tablet by mouth once daily.      clindamycin (CLEOCIN T) 1 % external solution Apply 1 drop topically as needed.      cyanocobalamin 1,000 mcg/mL injection 1 mL sc daily for 7 days, then weekly for 1 month, then monthly for 6 months 30 mL 1    cyclobenzaprine (FLEXERIL) 10 MG tablet Take 1 tablet by mouth as needed.      diphenoxylate-atropine 2.5-0.025 mg (LOMOTIL) 2.5-0.025 mg per tablet Take 1 tablet by mouth every 6 (six) hours as needed.      esomeprazole (NEXIUM) 40 MG capsule Take 1 capsule by mouth Daily.      estradioL (ESTRACE) 1 MG tablet Take 1 tablet by mouth Daily.      fluticasone propionate (FLONASE) 50 mcg/actuation nasal spray 1 spray by Nasal route as needed.      gabapentin (NEURONTIN) 300 MG capsule Take 1 capsule by mouth 3 (three) times daily as needed (pain).      glucosamine-chondroitin 500-400 mg tablet Take 2 tablets by mouth once daily.      hydroCHLOROthiazide (HYDRODIURIL) 25 MG tablet Take 25 mg by mouth as needed.      levothyroxine (SYNTHROID) 50 MCG tablet Take 1 tablet by mouth Daily.      meloxicam (MOBIC) 15 MG tablet Take 1 tablet (15 mg total) by mouth Daily. 30 tablet 2    multivitamin capsule Take 1 capsule by mouth once daily.      vitamin D (VITAMIN D3) 1000 units Tab Take 1,000 Units by mouth Daily.         Social History     Socioeconomic History    Marital status:    Tobacco Use    Smoking status: Never    Smokeless tobacco: Never   Substance and Sexual Activity    Alcohol use: Yes     Alcohol/week: 3.0 standard drinks     Types: 1 Glasses of wine, 1 Cans of beer, 1 Shots of liquor per week     Comment: Weekly    Drug use: Never    Sexual activity: Not Currently     Birth control/protection: Post-menopausal, See Surgical Hx        Family History   Problem Relation Age of Onset  "   Heart disease Mother     Hypertension Mother     Arthritis Mother     Paranoid behavior Father     Pneumonia Father     Ovarian cancer Sister     Depression Sister     Anxiety disorder Sister     Arthritis Brother         Patient Care Team:  Tadeo Ambriz DO as PCP - General (Family Medicine)  Santa Howell III, MD as Consulting Physician (Gastroenterology)  Kendall Vuong MD as Consulting Physician (Cardiology)  DAVID Person MD (Orthopedic Surgery)  Tyler Farrell MD as Consulting Physician (Obstetrics and Gynecology)  Maureen Gaming NP (Pulmonary Disease)  Amber Wasserman MD as Consulting Physician (Neurology)       Subjective:     Review of Systems   Constitutional:  Negative for chills and fever.   Respiratory:  Negative for shortness of breath.    Cardiovascular:  Negative for chest pain.   Gastrointestinal:  Positive for abdominal pain and diarrhea. Negative for constipation.   Neurological:  Negative for dizziness and headaches.   Psychiatric/Behavioral:  The patient does not have insomnia.      See HPI for details        All Other ROS: Negative except as stated in HPI.       Objective:     /70   Pulse 72   Temp 98.1 °F (36.7 °C) (Tympanic)   Resp 18   Ht 5' 2.99" (1.6 m)   Wt 116.2 kg (256 lb 3.2 oz)   SpO2 98%   BMI 45.40 kg/m²     Physical Exam  Vitals reviewed.   Constitutional:       General: She is not in acute distress.     Appearance: Normal appearance.   Cardiovascular:      Rate and Rhythm: Normal rate and regular rhythm.      Heart sounds: No murmur heard.    No friction rub. No gallop.   Pulmonary:      Effort: No respiratory distress.      Breath sounds: No wheezing, rhonchi or rales.   Musculoskeletal:         General: No swelling, tenderness or deformity.      Right lower leg: No edema.      Left lower leg: No edema.   Skin:     General: Skin is warm and dry.      Findings: No lesion or rash.   Neurological:      General: No focal deficit present.      Mental " Status: She is alert.   Psychiatric:         Mood and Affect: Mood normal.             Assessment:       ICD-10-CM ICD-9-CM   1. Routine general medical examination at a health care facility  Z00.00 V70.0   2. Morbid obesity  E66.01 278.01   3. BMI 45.0-49.9, adult  Z68.42 V85.42   4. Diarrhea, unspecified type  R19.7 787.91        Plan:         Health Maintenance Topics with due status: Not Due       Topic Last Completion Date    TETANUS VACCINE 07/08/2021    Hemoglobin A1c (Diabetic Prevention Screening) 12/28/2021    Lipid Panel 06/21/2022    Mammogram 08/03/2022    Colorectal Cancer Screening 05/02/2023          Breast Cancer Screening - Last mammogram on 8/3/22. Next due 8/3/23.     Colon Cancer Screening - Next due 5/2/33.     Eye Exam - Recommend annually.    Dental Exam - Recommend biannual exams.     Vaccinations -   Immunization History   Administered Date(s) Administered    COVID-19, MRNA, LN-S, PF (Pfizer) (Purple Cap) 03/20/2021, 04/10/2021, 12/03/2021    Influenza - Quadrivalent - PF *Preferred* (6 months and older) 10/21/2021, 10/31/2022    Influenza - Trivalent (ADULT) 01/01/2019, 09/01/2019    Tdap 07/08/2021            1. Routine general medical examination at a health care facility    2. Morbid obesity    3. BMI 45.0-49.9, adult    4. Diarrhea, unspecified type  -Has been evaluated by GI. Encouraged patient to start taking a probiotic.     Encouraged diet, lifestyle changes and exercise for weight loss.     Medication List with Changes/Refills   Current Medications    ALBUTEROL (PROVENTIL/VENTOLIN HFA) 90 MCG/ACTUATION INHALER    Inhale 1 puff into the lungs daily as needed.       Start Date: --        End Date: --    ASPIRIN (ECOTRIN) 81 MG EC TABLET    Take 1 tablet by mouth Daily.       Start Date: --        End Date: --    BUDESONIDE-FORMOTEROL 160-4.5 MCG (SYMBICORT) 160-4.5 MCG/ACTUATION HFAA    Inhale 2 puffs into the lungs 2 (two) times daily.       Start Date: 2/23/2023 End Date: --     CALCIUM CARBONATE (CALCIUM 500 ORAL)    Take 1 tablet by mouth once daily.       Start Date: --        End Date: --    CALCIUM CARBONATE (TUMS) 200 MG CALCIUM (500 MG) CHEWABLE TABLET    Take 1 tablet by mouth once daily.       Start Date: --        End Date: --    CLINDAMYCIN (CLEOCIN T) 1 % EXTERNAL SOLUTION    Apply 1 drop topically as needed.       Start Date: --        End Date: --    CYANOCOBALAMIN 1,000 MCG/ML INJECTION    1 mL sc daily for 7 days, then weekly for 1 month, then monthly for 6 months       Start Date: 11/2/2022 End Date: --    CYCLOBENZAPRINE (FLEXERIL) 10 MG TABLET    Take 1 tablet by mouth as needed.       Start Date: --        End Date: --    DIPHENOXYLATE-ATROPINE 2.5-0.025 MG (LOMOTIL) 2.5-0.025 MG PER TABLET    Take 1 tablet by mouth every 6 (six) hours as needed.       Start Date: 5/12/2023 End Date: --    ESOMEPRAZOLE (NEXIUM) 40 MG CAPSULE    Take 1 capsule by mouth Daily.       Start Date: --        End Date: --    ESTRADIOL (ESTRACE) 1 MG TABLET    Take 1 tablet by mouth Daily.       Start Date: --        End Date: --    FLUTICASONE PROPIONATE (FLONASE) 50 MCG/ACTUATION NASAL SPRAY    1 spray by Nasal route as needed.       Start Date: --        End Date: --    GABAPENTIN (NEURONTIN) 300 MG CAPSULE    Take 1 capsule by mouth 3 (three) times daily as needed (pain).       Start Date: --        End Date: --    GLUCOSAMINE-CHONDROITIN 500-400 MG TABLET    Take 2 tablets by mouth once daily.       Start Date: --        End Date: --    HYDROCHLOROTHIAZIDE (HYDRODIURIL) 25 MG TABLET    Take 25 mg by mouth as needed.       Start Date: --        End Date: --    LEVOTHYROXINE (SYNTHROID) 50 MCG TABLET    Take 1 tablet by mouth Daily.       Start Date: --        End Date: --    MELOXICAM (MOBIC) 15 MG TABLET    Take 1 tablet (15 mg total) by mouth Daily.       Start Date: 3/14/2023 End Date: --    MULTIVITAMIN CAPSULE    Take 1 capsule by mouth once daily.       Start Date: --        End Date:  --    VITAMIN D (VITAMIN D3) 1000 UNITS TAB    Take 1,000 Units by mouth Daily.       Start Date: --        End Date: --          The patient's Health Maintenance was reviewed and the following appears to be due at this time:   Health Maintenance Due   Topic Date Due    Hepatitis C Screening  Never done    HIV Screening  Never done    Shingles Vaccine (1 of 2) Never done    COVID-19 Vaccine (4 - Booster for Pfizer series) 01/28/2022         Follow up in about 6 months (around 11/15/2023) for Follow up. In addition to their scheduled follow up, the patient has also been instructed to follow up on as needed basis.

## 2023-06-07 ENCOUNTER — LAB VISIT (OUTPATIENT)
Dept: LAB | Facility: HOSPITAL | Age: 63
End: 2023-06-07
Attending: INTERNAL MEDICINE
Payer: COMMERCIAL

## 2023-06-07 DIAGNOSIS — R19.7 DIARRHEA OF PRESUMED INFECTIOUS ORIGIN: Primary | ICD-10-CM

## 2023-06-07 DIAGNOSIS — M51.36 DEGENERATION OF INTERVERTEBRAL DISC OF LUMBAR REGION: ICD-10-CM

## 2023-06-07 PROCEDURE — 82653 EL-1 FECAL QUANTITATIVE: CPT

## 2023-06-07 RX ORDER — MELOXICAM 15 MG/1
TABLET ORAL
Qty: 30 TABLET | Refills: 5 | Status: SHIPPED | OUTPATIENT
Start: 2023-06-07 | End: 2023-07-11

## 2023-06-10 LAB — ELASTASE PANC STL-MCNT: >500 MCG/G

## 2023-06-21 ENCOUNTER — HOSPITAL ENCOUNTER (EMERGENCY)
Facility: HOSPITAL | Age: 63
Discharge: HOME | End: 2023-06-21
Attending: EMERGENCY MEDICINE
Payer: COMMERCIAL

## 2023-06-21 VITALS
BODY MASS INDEX: 40.02 KG/M2 | TEMPERATURE: 99 F | SYSTOLIC BLOOD PRESSURE: 119 MMHG | HEART RATE: 78 BPM | DIASTOLIC BLOOD PRESSURE: 71 MMHG | WEIGHT: 249 LBS | OXYGEN SATURATION: 95 % | HEIGHT: 66 IN | RESPIRATION RATE: 13 BRPM

## 2023-06-21 DIAGNOSIS — R10.13 EPIGASTRIC ABDOMINAL PAIN: Primary | ICD-10-CM

## 2023-06-21 DIAGNOSIS — R11.2 NAUSEA AND VOMITING, UNSPECIFIED VOMITING TYPE: ICD-10-CM

## 2023-06-21 DIAGNOSIS — R07.9 CHEST PAIN: ICD-10-CM

## 2023-06-21 DIAGNOSIS — K29.70 GASTRITIS, PRESENCE OF BLEEDING UNSPECIFIED, UNSPECIFIED CHRONICITY, UNSPECIFIED GASTRITIS TYPE: ICD-10-CM

## 2023-06-21 LAB
ALBUMIN SERPL-MCNC: 4.1 G/DL (ref 3.4–4.8)
ALBUMIN/GLOB SERPL: 1.3 RATIO (ref 1.1–2)
ALP SERPL-CCNC: 70 UNIT/L (ref 40–150)
ALT SERPL-CCNC: 17 UNIT/L (ref 0–55)
APPEARANCE UR: CLEAR
AST SERPL-CCNC: 14 UNIT/L (ref 5–34)
BACTERIA #/AREA URNS AUTO: NORMAL /HPF
BASOPHILS # BLD AUTO: 0.01 X10(3)/MCL
BASOPHILS NFR BLD AUTO: 0.1 %
BILIRUB UR QL STRIP.AUTO: NEGATIVE MG/DL
BILIRUBIN DIRECT+TOT PNL SERPL-MCNC: 0.4 MG/DL
BUN SERPL-MCNC: 12.4 MG/DL (ref 9.8–20.1)
CALCIUM SERPL-MCNC: 9.6 MG/DL (ref 8.4–10.2)
CHLORIDE SERPL-SCNC: 109 MMOL/L (ref 98–107)
CO2 SERPL-SCNC: 24 MMOL/L (ref 23–31)
COLOR UR: YELLOW
CREAT SERPL-MCNC: 0.81 MG/DL (ref 0.55–1.02)
EOSINOPHIL # BLD AUTO: 0.03 X10(3)/MCL (ref 0–0.9)
EOSINOPHIL NFR BLD AUTO: 0.3 %
ERYTHROCYTE [DISTWIDTH] IN BLOOD BY AUTOMATED COUNT: 15.3 % (ref 11.5–17)
GFR SERPLBLD CREATININE-BSD FMLA CKD-EPI: >60 MLS/MIN/1.73/M2
GLOBULIN SER-MCNC: 3.2 GM/DL (ref 2.4–3.5)
GLUCOSE SERPL-MCNC: 120 MG/DL (ref 82–115)
GLUCOSE UR QL STRIP.AUTO: NEGATIVE MG/DL
HCT VFR BLD AUTO: 44.2 % (ref 37–47)
HGB BLD-MCNC: 14 G/DL (ref 12–16)
IMM GRANULOCYTES # BLD AUTO: 0.02 X10(3)/MCL (ref 0–0.04)
IMM GRANULOCYTES NFR BLD AUTO: 0.2 %
KETONES UR QL STRIP.AUTO: ABNORMAL MG/DL
LEUKOCYTE ESTERASE UR QL STRIP.AUTO: NEGATIVE UNIT/L
LIPASE SERPL-CCNC: 14 U/L
LYMPHOCYTES # BLD AUTO: 1.36 X10(3)/MCL (ref 0.6–4.6)
LYMPHOCYTES NFR BLD AUTO: 15.5 %
MCH RBC QN AUTO: 27.8 PG (ref 27–31)
MCHC RBC AUTO-ENTMCNC: 31.7 G/DL (ref 33–36)
MCV RBC AUTO: 87.9 FL (ref 80–94)
MONOCYTES # BLD AUTO: 0.8 X10(3)/MCL (ref 0.1–1.3)
MONOCYTES NFR BLD AUTO: 9.1 %
NEUTROPHILS # BLD AUTO: 6.54 X10(3)/MCL (ref 2.1–9.2)
NEUTROPHILS NFR BLD AUTO: 74.8 %
NITRITE UR QL STRIP.AUTO: NEGATIVE
NRBC BLD AUTO-RTO: 0 %
PH UR STRIP.AUTO: 6 [PH]
PLATELET # BLD AUTO: 398 X10(3)/MCL (ref 130–400)
PMV BLD AUTO: 9.1 FL (ref 7.4–10.4)
POTASSIUM SERPL-SCNC: 3.8 MMOL/L (ref 3.5–5.1)
PROT SERPL-MCNC: 7.3 GM/DL (ref 5.8–7.6)
PROT UR QL STRIP.AUTO: NEGATIVE MG/DL
RBC # BLD AUTO: 5.03 X10(6)/MCL (ref 4.2–5.4)
RBC #/AREA URNS AUTO: <5 /HPF
RBC UR QL AUTO: NEGATIVE UNIT/L
SODIUM SERPL-SCNC: 142 MMOL/L (ref 136–145)
SP GR UR STRIP.AUTO: 1.02 (ref 1–1.03)
SQUAMOUS #/AREA URNS AUTO: <5 /HPF
UROBILINOGEN UR STRIP-ACNC: 1 MG/DL
WBC # SPEC AUTO: 8.76 X10(3)/MCL (ref 4.5–11.5)
WBC #/AREA URNS AUTO: <5 /HPF

## 2023-06-21 PROCEDURE — C9113 INJ PANTOPRAZOLE SODIUM, VIA: HCPCS | Performed by: EMERGENCY MEDICINE

## 2023-06-21 PROCEDURE — 93010 EKG 12-LEAD: ICD-10-PCS | Mod: ,,, | Performed by: INTERNAL MEDICINE

## 2023-06-21 PROCEDURE — 93010 ELECTROCARDIOGRAM REPORT: CPT | Mod: ,,, | Performed by: INTERNAL MEDICINE

## 2023-06-21 PROCEDURE — 81001 URINALYSIS AUTO W/SCOPE: CPT | Performed by: EMERGENCY MEDICINE

## 2023-06-21 PROCEDURE — 80053 COMPREHEN METABOLIC PANEL: CPT | Performed by: EMERGENCY MEDICINE

## 2023-06-21 PROCEDURE — 63600175 PHARM REV CODE 636 W HCPCS: Performed by: EMERGENCY MEDICINE

## 2023-06-21 PROCEDURE — 85025 COMPLETE CBC W/AUTO DIFF WBC: CPT | Performed by: EMERGENCY MEDICINE

## 2023-06-21 PROCEDURE — 96374 THER/PROPH/DIAG INJ IV PUSH: CPT

## 2023-06-21 PROCEDURE — 96375 TX/PRO/DX INJ NEW DRUG ADDON: CPT

## 2023-06-21 PROCEDURE — 83690 ASSAY OF LIPASE: CPT | Performed by: EMERGENCY MEDICINE

## 2023-06-21 PROCEDURE — 99284 EMERGENCY DEPT VISIT MOD MDM: CPT | Mod: 25

## 2023-06-21 PROCEDURE — 25000003 PHARM REV CODE 250: Performed by: EMERGENCY MEDICINE

## 2023-06-21 PROCEDURE — 96361 HYDRATE IV INFUSION ADD-ON: CPT

## 2023-06-21 PROCEDURE — 93005 ELECTROCARDIOGRAM TRACING: CPT

## 2023-06-21 RX ORDER — ONDANSETRON 2 MG/ML
4 INJECTION INTRAMUSCULAR; INTRAVENOUS
Status: COMPLETED | OUTPATIENT
Start: 2023-06-21 | End: 2023-06-21

## 2023-06-21 RX ORDER — PANTOPRAZOLE SODIUM 40 MG/10ML
40 INJECTION, POWDER, LYOPHILIZED, FOR SOLUTION INTRAVENOUS
Status: COMPLETED | OUTPATIENT
Start: 2023-06-21 | End: 2023-06-21

## 2023-06-21 RX ORDER — METOCLOPRAMIDE HYDROCHLORIDE 5 MG/ML
10 INJECTION INTRAMUSCULAR; INTRAVENOUS
Status: COMPLETED | OUTPATIENT
Start: 2023-06-21 | End: 2023-06-21

## 2023-06-21 RX ORDER — MAG HYDROX/ALUMINUM HYD/SIMETH 200-200-20
30 SUSPENSION, ORAL (FINAL DOSE FORM) ORAL ONCE
Status: COMPLETED | OUTPATIENT
Start: 2023-06-21 | End: 2023-06-21

## 2023-06-21 RX ORDER — ONDANSETRON 4 MG/1
4 TABLET, ORALLY DISINTEGRATING ORAL EVERY 6 HOURS PRN
Qty: 20 TABLET | Refills: 0 | Status: SHIPPED | OUTPATIENT
Start: 2023-06-21 | End: 2023-11-01

## 2023-06-21 RX ORDER — SUCRALFATE 1 G/1
1 TABLET ORAL
Qty: 40 TABLET | Refills: 0 | Status: SHIPPED | OUTPATIENT
Start: 2023-06-21 | End: 2023-07-01

## 2023-06-21 RX ADMIN — METOCLOPRAMIDE 10 MG: 5 INJECTION, SOLUTION INTRAMUSCULAR; INTRAVENOUS at 05:06

## 2023-06-21 RX ADMIN — ALUMINUM HYDROXIDE, MAGNESIUM HYDROXIDE, AND SIMETHICONE 30 ML: 200; 200; 20 SUSPENSION ORAL at 05:06

## 2023-06-21 RX ADMIN — PANTOPRAZOLE SODIUM 40 MG: 40 INJECTION, POWDER, FOR SOLUTION INTRAVENOUS at 02:06

## 2023-06-21 RX ADMIN — SODIUM CHLORIDE, POTASSIUM CHLORIDE, SODIUM LACTATE AND CALCIUM CHLORIDE 1000 ML: 600; 310; 30; 20 INJECTION, SOLUTION INTRAVENOUS at 03:06

## 2023-06-21 RX ADMIN — ONDANSETRON 4 MG: 2 INJECTION INTRAMUSCULAR; INTRAVENOUS at 02:06

## 2023-06-21 NOTE — ED PROVIDER NOTES
"Encounter Date: 6/21/2023    SCRIBE #1 NOTE: I, Giselle Vi, am scribing for, and in the presence of,  Rose Mary Rivera MD. I have scribed the following portions of the note - Other sections scribed: HPI, ROS, PE.     History     Chief Complaint   Patient presents with    Emesis     Pt c/o "vomiting brown", vomitied x2 and yesterday as well. states "I think I may be bleeding". Pt states she's been off her PPI's for 2wks so she can "a test" done. Denies diarrhea. Last BM yesterday/normal. Currently report reflux and heaviness and burning on chest. Hx hiatal hernia.     63 y/o female with pmhx of CAD, GERD, hiatal hernia, and hypothyroidism presents to ED for vomiting since 6/19. Pt reports vomiting on 6/19 and dark brown emesis (x2) on 6/20. States she ate soup and ate chips for dinner. Notes she has been off of her PPI's for 2 weeks. She reports feeling nauseous when taking Pepcid or Tums. She reports epigastric pain and normal output. She took a baby ASA yesterday, but have not taken anything today for her nausea. She denies diarrhea, last normal BM yesterday.    Gastroenterologist: Santa Howell MD    The history is provided by the patient. No  was used.   Review of patient's allergies indicates:  No Known Allergies  Past Medical History:   Diagnosis Date    Anxiety disorder, unspecified     Bronchial asthma     CAD (coronary artery disease)     Depression     Family history of ovarian cancer     sister    GERD (gastroesophageal reflux disease)     Hiatal hernia     History of pressure ulcer     Hypothyroidism, unspecified     Osteoarthritis of knee     Personal history of colonic polyps 05/02/2023    s/p polypectomy - Dr Santa Howell, III    Postmenopausal state     Rheumatoid arthritis, unspecified     Thyroid disease     Vitamin D deficiency      Past Surgical History:   Procedure Laterality Date    ABLATION, FIBROID, UTERUS, LAPAROSCOPIC, WITH US GUIDANCE  2019    Dr Tyler Farrell    " CHOLECYSTECTOMY  2010    COLONOSCOPY W/ BIOPSIES AND POLYPECTOMY  05/12/2020    Dr Santa Howell, III    COLONOSCOPY W/ BIOPSIES AND POLYPECTOMY  05/02/2023    Dr Santa Howell, III    DILATION AND CURETTAGE OF UTERUS  1981    Dr Tyler Farrell    ESOPHAGOGASTRODUODENOSCOPY  04/25/2019    Dr Santa Howell, III    HYSTERECTOMY, TOTAL, LAPAROSCOPIC, WITH SALPINGO-OOPHORECTOMY Bilateral 04/2022    Dr Tyler Farrell    SINUS SURGERY  2018    TONSILLECTOMY  1972    TOTAL KNEE ARTHROPLASTY Left 12/28/2021    Dr Abdelrahman Person    TUBAL LIGATION Bilateral 1988    Dr Tyler Farrell     Family History   Problem Relation Age of Onset    Heart disease Mother     Hypertension Mother     Arthritis Mother     Paranoid behavior Father     Pneumonia Father     Ovarian cancer Sister     Depression Sister     Anxiety disorder Sister     Arthritis Brother      Social History     Tobacco Use    Smoking status: Never    Smokeless tobacco: Never   Substance Use Topics    Alcohol use: Yes     Alcohol/week: 3.0 standard drinks     Types: 1 Glasses of wine, 1 Cans of beer, 1 Shots of liquor per week     Comment: Weekly    Drug use: Never     Review of Systems   Constitutional:  Negative for chills, diaphoresis and fever.   HENT:  Negative for congestion, ear pain, sinus pain and sore throat.    Eyes:  Negative for pain, discharge and visual disturbance.   Respiratory:  Negative for cough, shortness of breath, wheezing and stridor.    Cardiovascular:  Negative for palpitations.   Gastrointestinal:  Positive for abdominal pain (epigastric), nausea and vomiting. Negative for constipation, diarrhea and rectal pain.   Genitourinary:  Negative for dysuria and hematuria.   Musculoskeletal:  Negative for back pain and myalgias.   Skin:  Negative for rash.   Neurological:  Negative for dizziness, syncope, numbness and headaches.   Hematological: Negative.    Psychiatric/Behavioral: Negative.     All other systems reviewed and are  negative.    Physical Exam     Initial Vitals [06/21/23 0044]   BP Pulse Resp Temp SpO2   125/72 95 20 99.2 °F (37.3 °C) 96 %      MAP       --         Physical Exam    Nursing note and vitals reviewed.  Constitutional: She appears well-developed and well-nourished. She is not diaphoretic. No distress.   HENT:   Head: Normocephalic and atraumatic.   Nose: Nose normal.   Mouth/Throat: Oropharynx is clear and moist.   Eyes: Conjunctivae and EOM are normal. Pupils are equal, round, and reactive to light.   Neck: Trachea normal. Neck supple.   Normal range of motion.  Cardiovascular:  Normal rate, regular rhythm, normal heart sounds and intact distal pulses.           No murmur heard.  Pulmonary/Chest: Breath sounds normal. No respiratory distress. She has no wheezes. She has no rhonchi. She has no rales. She exhibits no tenderness.   Abdominal: Abdomen is soft. Bowel sounds are normal. She exhibits no distension and no mass. There is abdominal tenderness (mild) in the epigastric area. There is no rebound and no guarding.   Musculoskeletal:         General: No tenderness or edema. Normal range of motion.      Cervical back: Normal range of motion and neck supple.      Lumbar back: Normal. Normal range of motion.     Neurological: She is alert and oriented to person, place, and time. She has normal strength. No cranial nerve deficit or sensory deficit.   Skin: Skin is warm and dry. Capillary refill takes less than 2 seconds. No abscess noted. No erythema. No pallor.   Psychiatric: She has a normal mood and affect. Her behavior is normal. Judgment and thought content normal.       ED Course   Procedures  Labs Reviewed   COMPREHENSIVE METABOLIC PANEL - Abnormal; Notable for the following components:       Result Value    Chloride 109 (*)     Glucose Level 120 (*)     All other components within normal limits   URINALYSIS, REFLEX TO URINE CULTURE - Abnormal; Notable for the following components:    Ketones, UA 2+ (*)      All other components within normal limits   CBC WITH DIFFERENTIAL - Abnormal; Notable for the following components:    MCHC 31.7 (*)     All other components within normal limits   LIPASE - Normal   URINALYSIS, MICROSCOPIC - Normal   CBC W/ AUTO DIFFERENTIAL    Narrative:     The following orders were created for panel order CBC auto differential.  Procedure                               Abnormality         Status                     ---------                               -----------         ------                     CBC with Differential[[id number]]        Abnormal            Final result                 Please view results for these tests on the individual orders.        ECG Results              EKG 12-lead (Final result)  Result time 06/21/23 07:48:39      Final result by Interface, Lab In [facility] (06/21/23 07:48:39)                   Narrative:    Test Reason : R07.9,    Vent. Rate : 093 BPM     Atrial Rate : 093 BPM     P-R Int : 168 ms          QRS Dur : 084 ms      QT Int : 352 ms       P-R-T Axes : 039 -03 025 degrees     QTc Int : 437 ms    Normal sinus rhythm  Moderate voltage criteria for LVH, may be normal variant ( R in aVL ,   [place] product )  Nonspecific ST abnormality  Abnormal ECG  No previous ECGs available  Confirmed by Aleksandr Velez MD ([id number]) on 6/21/2023 7:48:26 AM    Referred By:             Confirmed By:Aleksandr Velez MD                                  Imaging Results    None          Medications   ondansetron injection 4 mg (4 mg Intravenous Given 6/21/23 0235)   pantoprazole injection 40 mg (40 mg Intravenous Given 6/21/23 0235)   lactated ringers bolus 1,000 mL (0 mLs Intravenous Stopped 6/21/23 0442)   aluminum-magnesium hydroxide-simethicone 200-200-20 mg/5 mL suspension 30 mL (30 mLs Oral Given 6/21/23 0556)   metoclopramide HCl injection 10 mg (10 mg Intravenous Given 6/21/23 0556)     Medical Decision Making:   History:   Old Medical Records: I decided to obtain old medical records.  Old  Records Summarized: records from clinic visits.  Initial Assessment:   See hpi  Independently Interpreted Test(s):   I have ordered and independently interpreted EKG Reading(s) - see prior notes  Clinical Tests:   Lab Tests: Ordered and Reviewed  The following lab test(s) were unremarkable: CBC, CMP, Lipase and Troponin  Medical Tests: Ordered and Reviewed        Scribe Attestation:   Scribe #1: I performed the above scribed service and the documentation accurately describes the services I performed. I attest to the accuracy of the note.  Comments: Attending:   Physician Attestation Statement for Scribe #1: Rose Mary MCKEON MD, personally performed the services described in this documentation. All medical record entries made by the scribe were at my direction and in my presence.  I have reviewed the chart and agree that the record reflects my personal performance and is accurate and complete.        Attending Attestation:           Physician Attestation for Scribe:  Physician Attestation Statement for Scribe #1: Rose Mary MCKEON MD, reviewed documentation, as scribed by Giselle Song in my presence, and it is both accurate and complete.         Medical Decision Making  The differential diagnosis includes, but is not limited to: gastritis, upper GI bleed, and dehydration.  Cbc, cmp, trop, lipase ordered and reviewed  Workup unremarkable other than some mild hyperglycemia.  Signs and symptoms most consistent with gastritis occurred as she would held her PPI for testing.  Symptoms resolve with antiemetics and PPI administration as well as p.o. Maalox and she was able to tolerate p.o. and was comfortable with discharge with GI follow-up.  Already has PPI at home.  Add Carafate as well and reiterated bland diet.  She would not have any emesis here in her H&H is normal and she can continue outpatient management    Problems Addressed:  Chest pain: acute illness or injury  Epigastric abdominal pain: acute illness or  injury that poses a threat to life or bodily functions  Gastritis, presence of bleeding unspecified, unspecified chronicity, unspecified gastritis type: acute illness or injury that poses a threat to life or bodily functions  Nausea and vomiting, unspecified vomiting type: acute illness or injury that poses a threat to life or bodily functions    Amount and/or Complexity of Data Reviewed  Independent Historian: spouse  External Data Reviewed: notes.  Labs: ordered. Decision-making details documented in ED Course.  ECG/medicine tests: ordered and independent interpretation performed.    Risk  OTC drugs.  Prescription drug management.            ED Course as of 06/21/23 0810   Wed Jun 21, 2023   0225 EKG performed at 1:04 a.m. rate of 93 normal sinus rhythm with LVH and nonspecific ST abnormality [BS]   0348 Feeling much better, planning for fluids and po challenge [BS]   0520 After eating reported burning again, will try maalox and re-evaluate [BS]   0654 Tolerating p.o. and is comfortable with discharge [BS]      ED Course User Index  [BS] Rose Mary Rivera MD                 Clinical Impression:   Final diagnoses:  [R07.9] Chest pain  [R10.13] Epigastric abdominal pain (Primary)  [R11.2] Nausea and vomiting, unspecified vomiting type  [K29.70] Gastritis, presence of bleeding unspecified, unspecified chronicity, unspecified gastritis type        ED Disposition Condition    Discharge Stable          ED Prescriptions       Medication Sig Dispense Start Date End Date Auth. Provider    sucralfate (CARAFATE) 1 gram tablet Take 1 tablet (1 g total) by mouth 4 (four) times daily before meals and nightly. for 10 days 40 tablet 6/21/2023 7/1/2023 Rose Mary Rivera MD    ondansetron (ZOFRAN-ODT) 4 MG TbDL Take 1 tablet (4 mg total) by mouth every 6 (six) hours as needed (nausea, vomiting). 20 tablet 6/21/2023 -- Rose Mary Rivera MD          Follow-up Information       Follow up With Specialties Details Why Contact Info     Santa Howell III, MD Gastroenterology Call today  [address] [facility] [address]  [address]  [facility] [address]  [phone]      Tadeo Ambriz,  Family Medicine Schedule an appointment as soon as possible for a visit   [address] [facility] [address]  [phone]      [facility] - Emergency Dept Emergency Medicine  As needed, If symptoms worsen [address] [facility] [id number] [phone]             Rose Mary Rivera MD  06/21/23 0845

## 2023-06-22 LAB
CGA SERPL-MCNC: 34 NG/ML
ELIA CELIKEY IGA (TTG IGA) QUANTITATIVE: 0.5 U/ML

## 2023-07-11 ENCOUNTER — OFFICE VISIT (OUTPATIENT)
Dept: FAMILY MEDICINE | Facility: CLINIC | Age: 63
End: 2023-07-11
Payer: COMMERCIAL

## 2023-07-11 VITALS
DIASTOLIC BLOOD PRESSURE: 77 MMHG | SYSTOLIC BLOOD PRESSURE: 115 MMHG | BODY MASS INDEX: 40.05 KG/M2 | TEMPERATURE: 98 F | HEART RATE: 71 BPM | HEIGHT: 66 IN | RESPIRATION RATE: 18 BRPM | WEIGHT: 249.19 LBS | OXYGEN SATURATION: 98 %

## 2023-07-11 DIAGNOSIS — E66.01 CLASS 3 SEVERE OBESITY DUE TO EXCESS CALORIES WITH SERIOUS COMORBIDITY AND BODY MASS INDEX (BMI) OF 40.0 TO 44.9 IN ADULT: ICD-10-CM

## 2023-07-11 DIAGNOSIS — M19.90 ARTHRITIS: Primary | ICD-10-CM

## 2023-07-11 DIAGNOSIS — K92.2 GASTROINTESTINAL HEMORRHAGE, UNSPECIFIED GASTROINTESTINAL HEMORRHAGE TYPE: ICD-10-CM

## 2023-07-11 PROBLEM — R13.10 DYSPHAGIA: Status: ACTIVE | Noted: 2023-07-11

## 2023-07-11 PROBLEM — J45.20 MILD INTERMITTENT ASTHMA WITHOUT COMPLICATION: Chronic | Status: ACTIVE | Noted: 2022-11-10

## 2023-07-11 PROBLEM — E66.813 CLASS 3 SEVERE OBESITY DUE TO EXCESS CALORIES WITH SERIOUS COMORBIDITY AND BODY MASS INDEX (BMI) OF 40.0 TO 44.9 IN ADULT: Status: ACTIVE | Noted: 2022-11-10

## 2023-07-11 PROCEDURE — 1159F PR MEDICATION LIST DOCUMENTED IN MEDICAL RECORD: ICD-10-PCS | Mod: CPTII,,, | Performed by: FAMILY MEDICINE

## 2023-07-11 PROCEDURE — 1159F MED LIST DOCD IN RCRD: CPT | Mod: CPTII,,, | Performed by: FAMILY MEDICINE

## 2023-07-11 PROCEDURE — 99213 OFFICE O/P EST LOW 20 MIN: CPT | Mod: ,,, | Performed by: FAMILY MEDICINE

## 2023-07-11 PROCEDURE — 3078F PR MOST RECENT DIASTOLIC BLOOD PRESSURE < 80 MM HG: ICD-10-PCS | Mod: CPTII,,, | Performed by: FAMILY MEDICINE

## 2023-07-11 PROCEDURE — 3074F PR MOST RECENT SYSTOLIC BLOOD PRESSURE < 130 MM HG: ICD-10-PCS | Mod: CPTII,,, | Performed by: FAMILY MEDICINE

## 2023-07-11 PROCEDURE — 1160F RVW MEDS BY RX/DR IN RCRD: CPT | Mod: CPTII,,, | Performed by: FAMILY MEDICINE

## 2023-07-11 PROCEDURE — 3008F BODY MASS INDEX DOCD: CPT | Mod: CPTII,,, | Performed by: FAMILY MEDICINE

## 2023-07-11 PROCEDURE — 1160F PR REVIEW ALL MEDS BY PRESCRIBER/CLIN PHARMACIST DOCUMENTED: ICD-10-PCS | Mod: CPTII,,, | Performed by: FAMILY MEDICINE

## 2023-07-11 PROCEDURE — 3078F DIAST BP <80 MM HG: CPT | Mod: CPTII,,, | Performed by: FAMILY MEDICINE

## 2023-07-11 PROCEDURE — 3074F SYST BP LT 130 MM HG: CPT | Mod: CPTII,,, | Performed by: FAMILY MEDICINE

## 2023-07-11 PROCEDURE — 3008F PR BODY MASS INDEX (BMI) DOCUMENTED: ICD-10-PCS | Mod: CPTII,,, | Performed by: FAMILY MEDICINE

## 2023-07-11 PROCEDURE — 99213 PR OFFICE/OUTPT VISIT, EST, LEVL III, 20-29 MIN: ICD-10-PCS | Mod: ,,, | Performed by: FAMILY MEDICINE

## 2023-07-11 RX ORDER — ALOSETRON HYDROCHLORIDE 1 MG/1
0.5 TABLET ORAL DAILY
COMMUNITY
Start: 2023-07-06

## 2023-07-11 RX ORDER — DEXLANSOPRAZOLE 60 MG/1
1 CAPSULE, DELAYED RELEASE ORAL EVERY MORNING
COMMUNITY
Start: 2023-06-28 | End: 2023-11-01

## 2023-07-11 NOTE — PROGRESS NOTES
Patient ID: [id number]     Chief Complaint: GI Bleeding ([facility] 6/21/23) and Hand Pain (Left hand/wrist pain and burning)        HPI:     Angelique Martin is a 62 y.o. female here today for GI Bleeding ([facility] 6/21/23) and Hand Pain (Left hand/wrist pain and burning). Currently being evaluated by her GI doctor for the GI bleed. Recently prescribed Dexilant but has not started it yet. She has since stopped all NSAIDS and aspirin. Her left hand is becoming more painful as a result. Pain(burning and weakness) worse at base of thumb. It is slowly starting to improve.         ----------------------------  Anxiety disorder, unspecified  Bronchial asthma  CAD (coronary artery disease)  Depression  Family history of ovarian cancer      Comment:  sister  GERD (gastroesophageal reflux disease)  Hiatal hernia  History of pressure ulcer  Hypothyroidism, unspecified  Osteoarthritis of knee  Personal history of colonic polyps      Comment:  s/p polypectomy - Dr Santa Howell, III  Postmenopausal state  Rheumatoid arthritis, unspecified  Thyroid disease  Vitamin D deficiency     Past Surgical History:   Procedure Laterality Date    ABLATION, FIBROID, UTERUS, LAPAROSCOPIC, WITH US GUIDANCE  2019    Dr Tyler Farrell    CHOLECYSTECTOMY  2010    COLONOSCOPY W/ BIOPSIES AND POLYPECTOMY  05/12/2020    Dr Santa Howell, III    COLONOSCOPY W/ BIOPSIES AND POLYPECTOMY  05/02/2023    Dr Santa Howell, III    DILATION AND CURETTAGE OF UTERUS  1981    Dr Tyler Farrell    ESOPHAGOGASTRODUODENOSCOPY  04/25/2019    Dr Santa Howell, III    ESOPHAGOGASTRODUODENOSCOPY  06/27/2023    Dr Santa Howell    HYSTERECTOMY, TOTAL, LAPAROSCOPIC, WITH SALPINGO-OOPHORECTOMY Bilateral 04/2022    Dr Tyler Farrell    SINUS SURGERY  2018    TONSILLECTOMY  1972    TOTAL KNEE ARTHROPLASTY Left 12/28/2021    Dr Abdelrahman Person    TUBAL LIGATION Bilateral 1988    Dr Tyler Farrell       Review of patient's allergies indicates:  No Known  Allergies    Outpatient Medications Marked as Taking for the 7/11/23 encounter (Office Visit) with Tadeo Ambriz, DO   Medication Sig Dispense Refill    albuterol (PROVENTIL/VENTOLIN HFA) 90 mcg/actuation inhaler Inhale 1 puff into the lungs daily as needed.      budesonide-formoterol 160-4.5 mcg (SYMBICORT) 160-4.5 mcg/actuation HFAA Inhale 2 puffs into the lungs 2 (two) times daily.      calcium carbonate (CALCIUM 500 ORAL) Take 1 tablet by mouth once daily.      calcium carbonate (TUMS) 200 mg calcium (500 mg) chewable tablet Take 1 tablet by mouth once daily.      clindamycin (CLEOCIN T) 1 % external solution Apply 1 drop topically as needed.      cyanocobalamin 1,000 mcg/mL injection 1 mL sc daily for 7 days, then weekly for 1 month, then monthly for 6 months 30 mL 1    cyclobenzaprine (FLEXERIL) 10 MG tablet Take 1 tablet by mouth as needed.      diphenoxylate-atropine 2.5-0.025 mg (LOMOTIL) 2.5-0.025 mg per tablet Take 1 tablet by mouth every 6 (six) hours as needed for Diarrhea.      esomeprazole (NEXIUM) 40 MG capsule Take 1 capsule by mouth Daily.      estradioL (ESTRACE) 1 MG tablet Take 1 tablet by mouth Daily.      fluticasone propionate (FLONASE) 50 mcg/actuation nasal spray 1 spray by Nasal route as needed.      gabapentin (NEURONTIN) 300 MG capsule Take 1 capsule by mouth 3 (three) times daily as needed (pain).      glucosamine-chondroitin 500-400 mg tablet Take 2 tablets by mouth once daily.      hydroCHLOROthiazide (HYDRODIURIL) 25 MG tablet Take 25 mg by mouth as needed.      levothyroxine (SYNTHROID) 50 MCG tablet Take 1 tablet by mouth Daily.      multivitamin capsule Take 1 capsule by mouth once daily.      ondansetron (ZOFRAN-ODT) 4 MG TbDL Take 1 tablet (4 mg total) by mouth every 6 (six) hours as needed (nausea, vomiting). 20 tablet 0    vitamin D (VITAMIN D3) 1000 units Tab Take 1,000 Units by mouth Daily.         Social History     Socioeconomic History    Marital  status:    Tobacco Use    Smoking status: Never    Smokeless tobacco: Never   Substance and Sexual Activity    Alcohol use: Yes     Alcohol/week: 2.0 standard drinks     Types: 1 Glasses of wine, 1 Cans of beer per week     Comment: Weekly    Drug use: Never    Sexual activity: Yes     Partners: Male     Birth control/protection: Post-menopausal, See Surgical Hx     Social Determinants of Health     Financial Resource Strain: Low Risk     Difficulty of Paying Living Expenses: Not hard at all   Food Insecurity: No Food Insecurity    Worried About Running Out of Food in the Last Year: Never true    Ran Out of Food in the Last Year: Never true   Transportation Needs: No Transportation Needs    Lack of Transportation (Medical): No    Lack of Transportation (Non-Medical): No   Physical Activity: Sufficiently Active    Days of Exercise per Week: 5 days    Minutes of Exercise per Session: 30 min   Stress: Stress Concern Present    Feeling of Stress : To some extent   Social Connections: Socially Integrated    Frequency of Communication with Friends and Family: More than three times a week    Frequency of Social Gatherings with Friends and Family: Twice a week    Attends Religion Services: More than 4 times per year    Active Member of Clubs or Organizations: Yes    Attends Club or Organization Meetings: 1 to 4 times per year    Marital Status:    Housing Stability: Low Risk     Unable to Pay for Housing in the Last Year: No    Number of Places Lived in the Last Year: 1    Unstable Housing in the Last Year: No        Family History   Problem Relation Age of Onset    Heart disease Mother     Hypertension Mother     Arthritis Mother     Paranoid behavior Father     Pneumonia Father     Ovarian cancer Sister     Depression Sister     Anxiety disorder Sister     Arthritis Brother         Patient Care Team:  Tadeo Ambriz DO as PCP - General (Family Medicine)  Santa Howell III,  "MD as Consulting Physician (Gastroenterology)  Kendall Vuong MD as Consulting Physician (Cardiology)  DAVID Person MD (Orthopedic Surgery)  Tyler Farrell MD as Consulting Physician (Obstetrics and Gynecology)  Maureen Gaming NP (Pulmonary Disease)  Amber Wasserman MD as Consulting Physician (Neurology)     Subjective:     Review of Systems   Constitutional:  Negative for chills and fever.   Respiratory:  Negative for shortness of breath.    Cardiovascular:  Negative for chest pain.   Gastrointestinal:  Positive for nausea and vomiting. Negative for constipation and diarrhea.        Coffee ground emesis.    Musculoskeletal:  Positive for joint pain.   Neurological:  Positive for weakness. Negative for headaches.   Psychiatric/Behavioral:  The patient does not have insomnia.      See HPI for details  All Other ROS: Negative except as stated in HPI.       Objective:     /77   Pulse 71   Temp 97.9 °F (36.6 °C) (Tympanic)   Resp 18   Ht 5' 5.75" (1.67 m)   Wt 113 kg (249 lb 3.2 oz)   SpO2 98%   BMI 40.53 kg/m²     Physical Exam  Vitals reviewed.   Constitutional:       General: She is not in acute distress.     Appearance: Normal appearance. She is obese.   Cardiovascular:      Rate and Rhythm: Normal rate and regular rhythm.      Heart sounds: No murmur heard.    No friction rub. No gallop.   Pulmonary:      Effort: No respiratory distress.      Breath sounds: No wheezing, rhonchi or rales.   Musculoskeletal:         General: No swelling, tenderness or deformity.      Right lower leg: No edema.      Left lower leg: No edema.   Skin:     General: Skin is warm and dry.      Findings: No lesion or rash.   Neurological:      General: No focal deficit present.      Mental Status: She is alert.   Psychiatric:         Mood and Affect: Mood normal.       Assessment/Plan:     1. Arthritis  Recommended patient use diclofenac Gel 4 times a day for pain and to keep upcoming appointment with orthopedic " physician.   2. Gastrointestinal hemorrhage, unspecified gastrointestinal hemorrhage type  Advised patient to avoid all NSAIDS orally and aspirin. Currently being evaluated by GI.       3. Obesity with BMI of 40.0-44.9.   -Recommended diet and weight loss as tolerated    Follow up:     Follow up if symptoms worsen or fail to improve. In addition to their scheduled follow up, the patient has also been instructed to follow up on as needed basis.

## 2023-07-24 ENCOUNTER — OFFICE VISIT (OUTPATIENT)
Dept: ORTHOPEDICS | Facility: CLINIC | Age: 63
End: 2023-07-24
Payer: COMMERCIAL

## 2023-07-24 ENCOUNTER — HOSPITAL ENCOUNTER (OUTPATIENT)
Dept: RADIOLOGY | Facility: CLINIC | Age: 63
Discharge: HOME | End: 2023-07-24
Attending: ORTHOPAEDIC SURGERY
Payer: COMMERCIAL

## 2023-07-24 VITALS — HEIGHT: 65 IN | WEIGHT: 249.31 LBS | BODY MASS INDEX: 41.54 KG/M2

## 2023-07-24 DIAGNOSIS — G56.02 LEFT CARPAL TUNNEL SYNDROME: ICD-10-CM

## 2023-07-24 DIAGNOSIS — M25.532 LEFT WRIST PAIN: ICD-10-CM

## 2023-07-24 DIAGNOSIS — M25.532 LEFT WRIST PAIN: Primary | ICD-10-CM

## 2023-07-24 PROCEDURE — 1159F MED LIST DOCD IN RCRD: CPT | Mod: CPTII,,, | Performed by: ORTHOPAEDIC SURGERY

## 2023-07-24 PROCEDURE — 1160F PR REVIEW ALL MEDS BY PRESCRIBER/CLIN PHARMACIST DOCUMENTED: ICD-10-PCS | Mod: CPTII,,, | Performed by: ORTHOPAEDIC SURGERY

## 2023-07-24 PROCEDURE — 3008F PR BODY MASS INDEX (BMI) DOCUMENTED: ICD-10-PCS | Mod: CPTII,,, | Performed by: ORTHOPAEDIC SURGERY

## 2023-07-24 PROCEDURE — 3008F BODY MASS INDEX DOCD: CPT | Mod: CPTII,,, | Performed by: ORTHOPAEDIC SURGERY

## 2023-07-24 PROCEDURE — 73110 X-RAY EXAM OF WRIST: CPT | Mod: LT,,, | Performed by: ORTHOPAEDIC SURGERY

## 2023-07-24 PROCEDURE — 99204 PR OFFICE/OUTPT VISIT, NEW, LEVL IV, 45-59 MIN: ICD-10-PCS | Mod: ,,, | Performed by: ORTHOPAEDIC SURGERY

## 2023-07-24 PROCEDURE — 1160F RVW MEDS BY RX/DR IN RCRD: CPT | Mod: CPTII,,, | Performed by: ORTHOPAEDIC SURGERY

## 2023-07-24 PROCEDURE — 73110 XR WRIST COMPLETE 3 VIEWS LEFT: ICD-10-PCS | Mod: LT,,, | Performed by: ORTHOPAEDIC SURGERY

## 2023-07-24 PROCEDURE — 99204 OFFICE O/P NEW MOD 45 MIN: CPT | Mod: ,,, | Performed by: ORTHOPAEDIC SURGERY

## 2023-07-24 PROCEDURE — 1159F PR MEDICATION LIST DOCUMENTED IN MEDICAL RECORD: ICD-10-PCS | Mod: CPTII,,, | Performed by: ORTHOPAEDIC SURGERY

## 2023-07-24 NOTE — PROGRESS NOTES
"Subjective:    CC: Wrist Pain (L wrist pain. she is left handed. states it has been ongoing for a while. at time she was not able to use. she does use a wrist brace with some relief. burning sensation at the wrist radiating to the palm)       HPI:  Patient comes in today complaining of left wrist pain.  Patient states it is achy, shooting down from her wrist into her fingers.  She states it is a burning sensation.  She has tried rest she has tried medication as well as bracing without relief.  She states she is dropping things, have difficulty with her coffee cup at times.    ROS: Refer to HPI for pertinent ROS. All other 12 point systems negative.    Objective:  Vitals:    07/24/23 0934   Weight: 113.1 kg (249 lb 4.8 oz)   Height: 5' 5" (1.651 m)        Physical Exam:  Patient is well-nourished developed female she is awake alert and orient x3 she has an apparent stress is pleasant cooperative.  Examination of the left hand and wrist compartment soft and warm.  Skin is intact.  There is no signs symptoms of DVT infection.  She does have a questionable Tinel's positive Phalen's exam.  There is no obvious thenar hypothenar wasting.  She is otherwise stable to stressing there is no crepitance or instability, neurovascular intact distally.    Images:  X-rays three views left wrist demonstrate no obvious fracture or dislocation. Images Reviewed and discussed with patient.    Assessment:  1. Left wrist pain  - X-Ray Wrist Complete Left; Future    2. Left carpal tunnel syndrome        Plan:  At this time we discussed her physical exam and x-ray findings.  We discussed various treatment options.  She has failed conservative treatment multiple times.  We will proceed with a nerve conduction study of the left upper extremity.  I would like see her back next week with her results.    Follow UP: No follow-ups on file.              "

## 2023-08-24 ENCOUNTER — OFFICE VISIT (OUTPATIENT)
Dept: ORTHOPEDICS | Facility: CLINIC | Age: 63
End: 2023-08-24
Payer: COMMERCIAL

## 2023-08-24 VITALS — HEIGHT: 65 IN | BODY MASS INDEX: 41.48 KG/M2 | WEIGHT: 249 LBS

## 2023-08-24 DIAGNOSIS — G56.02 LEFT CARPAL TUNNEL SYNDROME: Primary | ICD-10-CM

## 2023-08-24 PROCEDURE — 3008F PR BODY MASS INDEX (BMI) DOCUMENTED: ICD-10-PCS | Mod: CPTII,,, | Performed by: ORTHOPAEDIC SURGERY

## 2023-08-24 PROCEDURE — 1160F RVW MEDS BY RX/DR IN RCRD: CPT | Mod: CPTII,,, | Performed by: ORTHOPAEDIC SURGERY

## 2023-08-24 PROCEDURE — 99213 PR OFFICE/OUTPT VISIT, EST, LEVL III, 20-29 MIN: ICD-10-PCS | Mod: ,,, | Performed by: ORTHOPAEDIC SURGERY

## 2023-08-24 PROCEDURE — 99213 OFFICE O/P EST LOW 20 MIN: CPT | Mod: ,,, | Performed by: ORTHOPAEDIC SURGERY

## 2023-08-24 PROCEDURE — 1160F PR REVIEW ALL MEDS BY PRESCRIBER/CLIN PHARMACIST DOCUMENTED: ICD-10-PCS | Mod: CPTII,,, | Performed by: ORTHOPAEDIC SURGERY

## 2023-08-24 PROCEDURE — 1159F MED LIST DOCD IN RCRD: CPT | Mod: CPTII,,, | Performed by: ORTHOPAEDIC SURGERY

## 2023-08-24 PROCEDURE — 1159F PR MEDICATION LIST DOCUMENTED IN MEDICAL RECORD: ICD-10-PCS | Mod: CPTII,,, | Performed by: ORTHOPAEDIC SURGERY

## 2023-08-24 PROCEDURE — 3008F BODY MASS INDEX DOCD: CPT | Mod: CPTII,,, | Performed by: ORTHOPAEDIC SURGERY

## 2023-08-24 NOTE — PROGRESS NOTES
"Subjective:    CC: Results of the Left Hand and Results (LUE NCS results - pt states that her pain is better, but she does have some burning at times)       HPI:  Patient returns to clinic for repeat evaluation of left hand.  Nerve conduction study results show left mild median neuropathy.  She states her pain is better today.  She does have intermittent burning still in the thenar region.  She denies any numbness or tingling today.  She states that she believes her last flare-up was due to gardening.  She does have a brace that she uses intermittently as well as OTC anti-inflammatories when needed.  She states her pain is well managed today.    ROS: Refer to [facility] for pertinent ROS. All other 12 point systems negative.    Objective:  Vitals:    08/24/23 1315   Weight: 112.9 kg (249 lb)   Height: 5' 5" (1.651 m)        Physical Exam:  Left upper extremity compartments are soft and warm.  There are no signs or symptoms of DVT or infection.  Skin is intact.  The patient is tender to palpation about the thenar aspect.  No thenar or hypothenar wasting.  Supination and pronation to 90 degrees.  Wrist flexion to 90 degrees and wrist extension to 70 degrees.  Questionable Tinel's and Phalen's test.  Negative Finkelstein´s test.  Negative CMC grind. Neurovascularly intact distally.    Images:  Previous. Images Reviewed and discussed with patient.    Assessment:  1. Left carpal tunnel syndrome        Plan:  Physical exam and nerve conduction study results reviewed with patient.  She is fairly asymptomatic today it does not proceed wished to proceed with any further management.  She will utilize her brace and OTC anti-inflammatories as needed with appropriate precautions.  We have discussed a possible steroid injection versus surgical intervention in the future if necessary.    Follow UP: Follow up if symptoms worsen or fail to improve.              "

## 2023-09-25 ENCOUNTER — DOCUMENTATION ONLY (OUTPATIENT)
Dept: FAMILY MEDICINE | Facility: CLINIC | Age: 63
End: 2023-09-25
Payer: COMMERCIAL

## 2023-10-16 PROBLEM — K92.2 GASTROINTESTINAL HEMORRHAGE: Status: RESOLVED | Noted: 2023-07-11 | Resolved: 2023-10-16

## 2023-10-30 ENCOUNTER — TELEPHONE (OUTPATIENT)
Dept: NEUROLOGY | Facility: CLINIC | Age: 63
End: 2023-10-30
Payer: COMMERCIAL

## 2023-10-30 DIAGNOSIS — E53.8 B12 DEFICIENCY: Primary | ICD-10-CM

## 2023-10-30 RX ORDER — CYANOCOBALAMIN 1000 UG/ML
INJECTION, SOLUTION INTRAMUSCULAR; SUBCUTANEOUS
Qty: 30 ML | Refills: 1 | Status: SHIPPED | OUTPATIENT
Start: 2023-10-30 | End: 2023-11-15

## 2023-10-30 NOTE — TELEPHONE ENCOUNTER
Patient states she cancelled appt via portal.     Requesting call back to r/s appt and get refills on B12 injections.     Phone: [phone]

## 2023-11-01 ENCOUNTER — OFFICE VISIT (OUTPATIENT)
Dept: NEUROLOGY | Facility: CLINIC | Age: 63
End: 2023-11-01
Payer: COMMERCIAL

## 2023-11-01 VITALS
DIASTOLIC BLOOD PRESSURE: 96 MMHG | BODY MASS INDEX: 42.15 KG/M2 | SYSTOLIC BLOOD PRESSURE: 155 MMHG | WEIGHT: 253 LBS | HEIGHT: 65 IN | HEART RATE: 77 BPM

## 2023-11-01 DIAGNOSIS — G31.84 MILD COGNITIVE IMPAIRMENT: ICD-10-CM

## 2023-11-01 DIAGNOSIS — R79.89 LOW VITAMIN B12 LEVEL: ICD-10-CM

## 2023-11-01 PROCEDURE — 3080F DIAST BP >= 90 MM HG: CPT | Mod: CPTII,S$GLB,, | Performed by: NURSE PRACTITIONER

## 2023-11-01 PROCEDURE — 99214 OFFICE O/P EST MOD 30 MIN: CPT | Mod: S$GLB,,, | Performed by: NURSE PRACTITIONER

## 2023-11-01 PROCEDURE — 99999 PR PBB SHADOW E&M-EST. PATIENT-LVL IV: ICD-10-PCS | Mod: PBBFAC,,, | Performed by: NURSE PRACTITIONER

## 2023-11-01 PROCEDURE — 99999 PR PBB SHADOW E&M-EST. PATIENT-LVL IV: CPT | Mod: PBBFAC,,, | Performed by: NURSE PRACTITIONER

## 2023-11-01 PROCEDURE — 99214 PR OFFICE/OUTPT VISIT, EST, LEVL IV, 30-39 MIN: ICD-10-PCS | Mod: S$GLB,,, | Performed by: NURSE PRACTITIONER

## 2023-11-01 PROCEDURE — 3077F PR MOST RECENT SYSTOLIC BLOOD PRESSURE >= 140 MM HG: ICD-10-PCS | Mod: CPTII,S$GLB,, | Performed by: NURSE PRACTITIONER

## 2023-11-01 PROCEDURE — 1160F PR REVIEW ALL MEDS BY PRESCRIBER/CLIN PHARMACIST DOCUMENTED: ICD-10-PCS | Mod: CPTII,S$GLB,, | Performed by: NURSE PRACTITIONER

## 2023-11-01 PROCEDURE — 1160F RVW MEDS BY RX/DR IN RCRD: CPT | Mod: CPTII,S$GLB,, | Performed by: NURSE PRACTITIONER

## 2023-11-01 PROCEDURE — 3080F PR MOST RECENT DIASTOLIC BLOOD PRESSURE >= 90 MM HG: ICD-10-PCS | Mod: CPTII,S$GLB,, | Performed by: NURSE PRACTITIONER

## 2023-11-01 PROCEDURE — 3008F BODY MASS INDEX DOCD: CPT | Mod: CPTII,S$GLB,, | Performed by: NURSE PRACTITIONER

## 2023-11-01 PROCEDURE — 3077F SYST BP >= 140 MM HG: CPT | Mod: CPTII,S$GLB,, | Performed by: NURSE PRACTITIONER

## 2023-11-01 PROCEDURE — 1159F MED LIST DOCD IN RCRD: CPT | Mod: CPTII,S$GLB,, | Performed by: NURSE PRACTITIONER

## 2023-11-01 PROCEDURE — 1159F PR MEDICATION LIST DOCUMENTED IN MEDICAL RECORD: ICD-10-PCS | Mod: CPTII,S$GLB,, | Performed by: NURSE PRACTITIONER

## 2023-11-01 PROCEDURE — 3008F PR BODY MASS INDEX (BMI) DOCUMENTED: ICD-10-PCS | Mod: CPTII,S$GLB,, | Performed by: NURSE PRACTITIONER

## 2023-11-01 NOTE — PROGRESS NOTES
Subjective:       Patient ID: Angelique Martin is a 63 y.o. female.    Chief Complaint: Memory Loss (Pt states she feels her memory is about the same since last OV denies progression )      History of Present Illness:  Follow-up visit for [facility].  She is here today by herself.  She says she feels she is overall stable since last visit.  She has not noticed any worsening in her short-term memory.  Recall, for the past 5 years, she is been having difficulty with things like people's names, places she has been, and having to double check herself frequently on tasks.  She had labs and MRI after last visit.  MRI brain was overall unremarkable apart from age-related changes.  The image was reviewed by Dr. Wasserman.  TSH normal, B12 was 393 so she was started on injectable B12.  She says that no family or friends have noted any worsening in her cognition over the last year.  She is sleeping well.  No tremor or falls.  No problems navigating while driving.            Past Medical History:   Diagnosis Date    Anxiety disorder, unspecified     Bronchial asthma     CAD (coronary artery disease)     Depression     Family history of ovarian cancer     sister    GERD (gastroesophageal reflux disease)     Hiatal hernia     History of pressure ulcer     Hypothyroidism, unspecified     Osteoarthritis of knee     Personal history of colonic polyps 05/02/2023    s/p polypectomy - Dr Santa Howell, III    Postmenopausal state     Rheumatoid arthritis, unspecified     Thyroid disease     Vitamin D deficiency        Past Surgical History:   Procedure Laterality Date    ABLATION, FIBROID, UTERUS, LAPAROSCOPIC, WITH US GUIDANCE  2019    Dr Tyler Farrell    CHOLECYSTECTOMY  2010    COLONOSCOPY W/ BIOPSIES AND POLYPECTOMY  05/12/2020    Dr Santa Howell, III    COLONOSCOPY W/ BIOPSIES AND POLYPECTOMY  05/02/2023    Dr Santa Howell, III    DILATION AND CURETTAGE OF UTERUS  1981    Dr Tyler Farrell    ESOPHAGOGASTRODUODENOSCOPY  04/25/2019      Santa Howell, III    ESOPHAGOGASTRODUODENOSCOPY  06/27/2023    Dr Santa Howell    HYSTERECTOMY, TOTAL, LAPAROSCOPIC, WITH SALPINGO-OOPHORECTOMY Bilateral 04/2022    Dr Tyler Farrell    SINUS SURGERY  2018    TONSILLECTOMY  1972    TOTAL KNEE ARTHROPLASTY Left 12/28/2021    Dr Abdelrahman Person    TUBAL LIGATION Bilateral 1988    Dr Tyler Farrell        Family History   Problem Relation Age of Onset    Heart disease Mother     Hypertension Mother     Arthritis Mother     Paranoid behavior Father     Pneumonia Father     Ovarian cancer Sister     Depression Sister     Anxiety disorder Sister     Arthritis Brother         Social History     Socioeconomic History    Marital status:    Tobacco Use    Smoking status: Never    Smokeless tobacco: Never   Substance and Sexual Activity    Alcohol use: Yes     Alcohol/week: 2.0 standard drinks of alcohol     Types: 1 Glasses of wine, 1 Cans of beer per week     Comment: Weekly    Drug use: Never    Sexual activity: Yes     Partners: Male     Birth control/protection: Post-menopausal, See Surgical Hx     Social Determinants of Health     Financial Resource Strain: Low Risk  (7/11/2023)    Overall Financial Resource Strain (CARDIA)     Difficulty of Paying Living Expenses: Not hard at all   Food Insecurity: No Food Insecurity (7/11/2023)    Hunger Vital Sign     Worried About Running Out of Food in the Last Year: Never true     Ran Out of Food in the Last Year: Never true   Transportation Needs: No Transportation Needs (7/11/2023)    PRAPARE - Transportation     Lack of Transportation (Medical): No     Lack of Transportation (Non-Medical): No   Physical Activity: Sufficiently Active (7/11/2023)    Exercise Vital Sign     Days of Exercise per Week: 5 days     Minutes of Exercise per Session: 30 min   Stress: Stress Concern Present (7/11/2023)    [employer] - Occupational Stress Questionnaire     Feeling of Stress : To some extent   Social  Connections: Socially Integrated (7/11/2023)    Social Connection and Isolation Panel [NHANES]     Frequency of Communication with Friends and Family: More than three times a week     Frequency of Social Gatherings with Friends and Family: Twice a week     Attends Yazdanism Services: More than 4 times per year     Active Member of Clubs or Organizations: Yes     Attends Club or Organization Meetings: 1 to 4 times per year     Marital Status:    Housing Stability: Low Risk  (7/11/2023)    Housing Stability Vital Sign     Unable to Pay for Housing in the Last Year: No     Number of Places Lived in the Last Year: 1     Unstable Housing in the Last Year: No        Outpatient Encounter Medications as of 11/1/2023   Medication Sig Dispense Refill    albuterol (PROVENTIL/VENTOLIN HFA) 90 mcg/actuation inhaler Inhale 1 puff into the lungs daily as needed.      alosetron (LOTRONEX) 1 mg tablet Take 1 mg by mouth once daily.      calcium carbonate (CALCIUM 500 ORAL) Take 1 tablet by mouth once daily.      calcium carbonate (TUMS) 200 mg calcium (500 mg) chewable tablet Take 1 tablet by mouth once daily.      clindamycin (CLEOCIN T) 1 % external solution Apply 1 drop topically as needed.      cyanocobalamin 1,000 mcg/mL injection 1 mL sc daily for 7 days, then weekly for 1 month, then monthly for 6 months 30 mL 1    cyanocobalamin 1,000 mcg/mL injection 1 mL monthly 30 mL 1    diphenoxylate-atropine 2.5-0.025 mg (LOMOTIL) 2.5-0.025 mg per tablet Take 1 tablet by mouth every 6 (six) hours as needed for Diarrhea.      esomeprazole (NEXIUM) 40 MG capsule Take 1 capsule by mouth Daily.      estradioL (ESTRACE) 1 MG tablet Take 1 tablet by mouth Daily.      fluticasone propionate (FLONASE) 50 mcg/actuation nasal spray 1 spray by Nasal route as needed.      glucosamine-chondroitin 500-400 mg tablet Take 1 tablet by mouth 2 (two) times a day.      hydroCHLOROthiazide (HYDRODIURIL) 25 MG tablet Take 25 mg by mouth as needed.    "   levothyroxine (SYNTHROID) 50 MCG tablet Take 1 tablet by mouth Daily.      multivitamin capsule Take 1 capsule by mouth once daily.      vitamin D (VITAMIN D3) 1000 units Tab Take 1,000 Units by mouth Daily.      aspirin (ECOTRIN) 81 MG EC tablet Take 1 tablet by mouth Daily.      gabapentin (NEURONTIN) 300 MG capsule Take 1 capsule by mouth 3 (three) times daily as needed (pain).      [DISCONTINUED] budesonide-formoterol 160-4.5 mcg (SYMBICORT) 160-4.5 mcg/actuation HFAA Inhale 2 puffs into the lungs 2 (two) times daily.      [DISCONTINUED] cyclobenzaprine (FLEXERIL) 10 MG tablet Take 1 tablet by mouth as needed.      [DISCONTINUED] dexlansoprazole (DEXILANT) 60 mg capsule Take 1 capsule by mouth every morning.      [DISCONTINUED] ondansetron (ZOFRAN-ODT) 4 MG TbDL Take 1 tablet (4 mg total) by mouth every 6 (six) hours as needed (nausea, vomiting). (Patient not taking: Reported on 7/24/2023) 20 tablet 0     No facility-administered encounter medications on file as of 11/1/2023.      Objective:   BP (!) 155/96   Pulse 77   Ht 5' 5" (1.651 m)   Wt 114.8 kg (253 lb)   BMI 42.10 kg/m²        Physical Exam  General:  Alert and oriented  NAD  No overt edema    Cognition and Comprehension:  Speech and language intact  Follows commands  See MOCA  Cranial nerves:   CN 2_ Visual fields (full to confrontation both eyes)  CN 3, 4, 6_ Intact, MATHEUS, no nystagmus  CN 5_facial tactile sensation intact  CN 7_no face asymmetry; normal eye closure and smile  CN 8_hearing intact to spoken voice  CN 9, 10, 11_voice normal, shoulder shrug ok; deltoids not fatigable   CN 12 tongue_protrudes mid line    Muscle Strength and Tone:  Normal upper extremity tone  Normal lower extremity tone  Normal upper extremity strength  Normal lower extremity strength    Reflexes:  Normal and symmetric    Sensation:  Intact to light touch and temperature    Coordination and Gait:  Coordination and gait are normal   No ataxia with fTN    "   Assessment & Plan:      1. Mild cognitive impairment  Overview:  Initially evaluated October 2022 were short-term memory difficulty that started about 5 years prior to that.  MRI brain was ordered and reviewed by Dr. Wasserman and showed age-related changes.  TSH 1.1, B12 393.  She started B12 injections and now takes monthly sq B12.    MOCA scores:  10/2022: 30/30  10/2023: 26/30    Assessment & Plan:  -slight decline in Harwick score today, but patient does not feel there has been any decline.  Continue to monitor with annual Harwick was.  Additional historian at next visit would be helpful.  Recommend continuing monthly B12      2. Low vitamin B12 level          This note was created with the assistance of voice recognition software. There may be transcription errors as a result of using this technology however minimal. Effort has been made to assure accuracy of transcription but any obvious errors or omissions should be clarified with the author of the document.

## 2023-11-01 NOTE — ASSESSMENT & PLAN NOTE
-slight decline in Eaton score today, but patient does not feel there has been any decline.  Continue to monitor with annual Eaton was.  Additional historian at next visit would be helpful.  Recommend continuing monthly B12

## 2023-11-15 ENCOUNTER — OFFICE VISIT (OUTPATIENT)
Dept: FAMILY MEDICINE | Facility: CLINIC | Age: 63
End: 2023-11-15
Payer: COMMERCIAL

## 2023-11-15 VITALS
SYSTOLIC BLOOD PRESSURE: 124 MMHG | WEIGHT: 256.81 LBS | HEART RATE: 83 BPM | BODY MASS INDEX: 42.79 KG/M2 | TEMPERATURE: 98 F | HEIGHT: 65 IN | OXYGEN SATURATION: 98 % | RESPIRATION RATE: 20 BRPM | DIASTOLIC BLOOD PRESSURE: 76 MMHG

## 2023-11-15 DIAGNOSIS — E03.9 HYPOTHYROIDISM, UNSPECIFIED TYPE: ICD-10-CM

## 2023-11-15 DIAGNOSIS — R79.89 ELEVATED BRAIN NATRIURETIC PEPTIDE (BNP) LEVEL: Primary | ICD-10-CM

## 2023-11-15 DIAGNOSIS — R06.02 SOB (SHORTNESS OF BREATH) ON EXERTION: ICD-10-CM

## 2023-11-15 DIAGNOSIS — E66.01 CLASS 3 SEVERE OBESITY DUE TO EXCESS CALORIES WITH SERIOUS COMORBIDITY AND BODY MASS INDEX (BMI) OF 40.0 TO 44.9 IN ADULT: ICD-10-CM

## 2023-11-15 PROBLEM — K20.90 ESOPHAGITIS, UNSPECIFIED WITHOUT BLEEDING: Status: ACTIVE | Noted: 2022-11-10

## 2023-11-15 PROBLEM — K58.0 IRRITABLE BOWEL SYNDROME WITH DIARRHEA: Status: ACTIVE | Noted: 2023-11-15

## 2023-11-15 PROBLEM — K21.00 GASTRO-ESOPHAGEAL REFLUX DISEASE WITH ESOPHAGITIS: Status: ACTIVE | Noted: 2019-04-25

## 2023-11-15 PROCEDURE — 3074F PR MOST RECENT SYSTOLIC BLOOD PRESSURE < 130 MM HG: ICD-10-PCS | Mod: CPTII,,,

## 2023-11-15 PROCEDURE — 99214 PR OFFICE/OUTPT VISIT, EST, LEVL IV, 30-39 MIN: ICD-10-PCS | Mod: ,,,

## 2023-11-15 PROCEDURE — 1159F PR MEDICATION LIST DOCUMENTED IN MEDICAL RECORD: ICD-10-PCS | Mod: CPTII,,,

## 2023-11-15 PROCEDURE — 1160F RVW MEDS BY RX/DR IN RCRD: CPT | Mod: CPTII,,,

## 2023-11-15 PROCEDURE — 1159F MED LIST DOCD IN RCRD: CPT | Mod: CPTII,,,

## 2023-11-15 PROCEDURE — 1160F PR REVIEW ALL MEDS BY PRESCRIBER/CLIN PHARMACIST DOCUMENTED: ICD-10-PCS | Mod: CPTII,,,

## 2023-11-15 PROCEDURE — 3008F BODY MASS INDEX DOCD: CPT | Mod: CPTII,,,

## 2023-11-15 PROCEDURE — 99214 OFFICE O/P EST MOD 30 MIN: CPT | Mod: ,,,

## 2023-11-15 PROCEDURE — 3008F PR BODY MASS INDEX (BMI) DOCUMENTED: ICD-10-PCS | Mod: CPTII,,,

## 2023-11-15 PROCEDURE — 3078F DIAST BP <80 MM HG: CPT | Mod: CPTII,,,

## 2023-11-15 PROCEDURE — 3078F PR MOST RECENT DIASTOLIC BLOOD PRESSURE < 80 MM HG: ICD-10-PCS | Mod: CPTII,,,

## 2023-11-15 PROCEDURE — 3074F SYST BP LT 130 MM HG: CPT | Mod: CPTII,,,

## 2023-11-15 NOTE — ASSESSMENT & PLAN NOTE

## 2023-11-15 NOTE — ASSESSMENT & PLAN NOTE
Lab Results   Component Value Date    TSH 1.1710 10/31/2022      Will recheck TSH today and adjust medications as needed.   Continue Levothyroxine 50 mcg tablet daily.   Take medicine on an empty stomach with water (no other medications or beverages). Wait 30 minutes to eat or drink.  Report any symptoms of thinning hair, breaking nails, fatigue, weight gain or loss, palpitations.

## 2023-11-15 NOTE — PROGRESS NOTES
Patient ID: [id number]     Chief Complaint: Follow-up (6 month follow up)      HPI:     Angelique Martin is a 63 y.o. female here today for a 6 month follow up. She states that she is scheduled for an MRI by ENT for dizziness. She did lab work with her job and would like to go over the results. No other complaints today.   Past Medical History:   Diagnosis Date    Anxiety disorder, unspecified     Bronchial asthma     CAD (coronary artery disease)     Depression     Family history of ovarian cancer     sister    GERD (gastroesophageal reflux disease)     Hiatal hernia     History of pressure ulcer     Hypothyroidism, unspecified     Osteoarthritis of knee     Personal history of colonic polyps 05/02/2023    s/p polypectomy - Dr Santa Howell, III    Postmenopausal state     Rheumatoid arthritis, unspecified     Thyroid disease     Vitamin D deficiency         Past Surgical History:   Procedure Laterality Date    ABLATION, FIBROID, UTERUS, LAPAROSCOPIC, WITH US GUIDANCE  2019    Dr Tyler Farrell    CHOLECYSTECTOMY  2010    COLONOSCOPY W/ BIOPSIES AND POLYPECTOMY  05/12/2020    Dr Santa Howell, III    COLONOSCOPY W/ BIOPSIES AND POLYPECTOMY  05/02/2023    Dr Santa Howell, III    DILATION AND CURETTAGE OF UTERUS  1981    Dr Tyler Farrell    ESOPHAGOGASTRODUODENOSCOPY  04/25/2019    Dr Santa Howell, III    ESOPHAGOGASTRODUODENOSCOPY  06/27/2023    Dr Santa Howell    HYSTERECTOMY, TOTAL, LAPAROSCOPIC, WITH SALPINGO-OOPHORECTOMY Bilateral 04/2022    Dr Tyler Farrell    SINUS SURGERY  2018    TONSILLECTOMY  1972    TOTAL KNEE ARTHROPLASTY Left 12/28/2021    Dr Abdelrahman Person    TUBAL LIGATION Bilateral 1988    Dr Tyler Farrell        Social History     Socioeconomic History    Marital status:    Tobacco Use    Smoking status: Never    Smokeless tobacco: Never   Substance and Sexual Activity    Alcohol use: Yes     Alcohol/week: 2.0 standard drinks of alcohol     Types: 1 Glasses of wine, 1 Cans of beer per week      Comment: Weekly    Drug use: Never    Sexual activity: Yes     Partners: Male     Birth control/protection: Post-menopausal, See Surgical Hx     Social Determinants of Health     Financial Resource Strain: Low Risk  (7/11/2023)    Overall Financial Resource Strain (CARDIA)     Difficulty of Paying Living Expenses: Not hard at all   Food Insecurity: No Food Insecurity (7/11/2023)    Hunger Vital Sign     Worried About Running Out of Food in the Last Year: Never true     Ran Out of Food in the Last Year: Never true   Transportation Needs: No Transportation Needs (7/11/2023)    PRAPARE - Transportation     Lack of Transportation (Medical): No     Lack of Transportation (Non-Medical): No   Physical Activity: Sufficiently Active (7/11/2023)    Exercise Vital Sign     Days of Exercise per Week: 5 days     Minutes of Exercise per Session: 30 min   Stress: Stress Concern Present (7/11/2023)    [employer] - Occupational Stress Questionnaire     Feeling of Stress : To some extent   Social Connections: Socially Integrated (7/11/2023)    Social Connection and Isolation Panel [NHANES]     Frequency of Communication with Friends and Family: More than three times a week     Frequency of Social Gatherings with Friends and Family: Twice a week     Attends [employer] Services: More than 4 times per year     Active Member of Clubs or Organizations: Yes     Attends Club or Organization Meetings: 1 to 4 times per year     Marital Status:    Housing Stability: Low Risk  (7/11/2023)    Housing Stability Vital Sign     Unable to Pay for Housing in the Last Year: No     Number of Places Lived in the Last Year: 1     Unstable Housing in the Last Year: No        Current Outpatient Medications   Medication Instructions    albuterol (PROVENTIL/VENTOLIN HFA) 90 mcg/actuation inhaler 1 puff, Inhalation, Daily PRN    alosetron (LOTRONEX) 0.5 mg, Oral, Daily    aspirin (ECOTRIN) 81 MG EC tablet 1 tablet,  "Oral, Daily    calcium carbonate (CALCIUM 500 ORAL) 1 tablet, Oral, Daily    cyanocobalamin 1,000 mcg/mL injection 1 mL sc daily for 7 days, then weekly for 1 month, then monthly for 6 months    esomeprazole (NEXIUM) 40 MG capsule 1 capsule, Oral, Daily    estradioL (ESTRACE) 1 MG tablet 1 tablet, Oral, Daily    glucosamine-chondroitin 500-400 mg tablet 1 tablet, Oral, 2 times daily    levothyroxine (SYNTHROID) 50 MCG tablet 1 tablet, Oral, Daily    multivitamin capsule 1 capsule, Oral, Daily    vitamin D (VITAMIN D3) 1,000 Units, Oral, Daily       Review of patient's allergies indicates:  No Known Allergies     Patient Care Team:  Tadeo Ambriz DO as PCP - General (Family Medicine)  Santa Howell III, MD as Consulting Physician (Gastroenterology)  Kendall Vuong MD as Consulting Physician (Cardiology)  DAVID Person MD (Orthopedic Surgery)  Tyler Farrell MD as Consulting Physician (Obstetrics and Gynecology)  Maureen Gaming NP (Pulmonary Disease)  Amber Wasserman MD as Consulting Physician (Neurology)     Subjective:     Review of Systems    12 point review of systems conducted, negative except as stated in the history of present illness. See HPI for details.    Objective:     Visit Vitals  /76 (BP Location: Left arm, Patient Position: Sitting, BP Method: Large (Automatic))   Pulse 83   Temp 97.7 °F (36.5 °C)   Resp 20   Ht 5' 5" (1.651 m)   Wt 116.5 kg (256 lb 12.8 oz)   SpO2 98%   BMI 42.73 kg/m²       Physical Exam  Vitals and nursing note reviewed.   Constitutional:       General: She is not in acute distress.     Appearance: She is obese. She is not ill-appearing.   HENT:      Head: Normocephalic and atraumatic.      Mouth/Throat:      Mouth: Mucous membranes are moist.      Pharynx: Oropharynx is clear.   Eyes:      General: No scleral icterus.     Extraocular Movements: Extraocular movements intact.      Conjunctiva/sclera: Conjunctivae normal.      Pupils: Pupils are equal, " "round, and reactive to light.   Neck:      Vascular: No carotid bruit.   Cardiovascular:      Rate and Rhythm: Normal rate and regular rhythm.      Heart sounds: No murmur heard.     No friction rub. No gallop.   Pulmonary:      Effort: Pulmonary effort is normal. No respiratory distress.      Breath sounds: Normal breath sounds. No wheezing, rhonchi or rales.   Abdominal:      General: Abdomen is flat. Bowel sounds are normal. There is no distension.      Palpations: Abdomen is soft. There is no mass.      Tenderness: There is no abdominal tenderness.   Musculoskeletal:         General: Normal range of motion.      Cervical back: Normal range of motion and neck supple.      Right lower leg: Edema present.      Left lower leg: Edema present.   Skin:     General: Skin is warm and dry.   Neurological:      General: No focal deficit present.   Psychiatric:         Mood and Affect: Mood normal.         Labs Reviewed:     Chemistry:  Lab Results   Component Value Date     06/21/2023    K 3.8 06/21/2023    CHLORIDE 109 (H) 06/21/2023    BUN 12.4 06/21/2023    CREATININE 0.81 06/21/2023    EGFRNORACEVR >60 06/21/2023    GLUCOSE 120 (H) 06/21/2023    CALCIUM 9.6 06/21/2023    ALKPHOS 70 06/21/2023    LABPROT 7.3 06/21/2023    ALBUMIN 4.1 06/21/2023    BILIDIR 0.1 11/17/2021    IBILI 0.20 11/17/2021    AST 14 06/21/2023    ALT 17 06/21/2023    EVZYYSCL39KX 55.9 11/17/2021    TSH 1.1710 10/31/2022        No results found for: "HGBA1C", "MICROALBCREA"     Hematology:  Lab Results   Component Value Date    WBC 8.76 06/21/2023    HGB 14.0 06/21/2023    HCT 44.2 06/21/2023     06/21/2023       Lipid Panel:  Lab Results   Component Value Date    CHOL 185 11/17/2021    HDL 49 11/17/2021    .00 11/17/2021    TRIG 98 11/17/2021    TOTALCHOLEST 4 11/17/2021        Urine:  Lab Results   Component Value Date    COLORUA Yellow 06/21/2023    APPEARANCEUA Clear 06/21/2023    SGUA 1.020 06/21/2023    PHUA 6.0 06/21/2023 "    PROTEINUA Negative 06/21/2023    GLUCOSEUA Negative 06/21/2023    KETONESUA 2+ (A) 06/21/2023    BLOODUA Negative 06/21/2023    NITRITESUA Negative 06/21/2023    LEUKOCYTESUR Negative 06/21/2023    RBCUA <5 06/21/2023    WBCUA <5 06/21/2023    BACTERIA None Seen 06/21/2023        Assessment:       ICD-10-CM ICD-9-CM   1. Elevated brain natriuretic peptide (BNP) level  R79.89 790.99   2. SOB (shortness of breath) on exertion  R06.02 786.05   3. Hypothyroidism, unspecified type  E03.9 244.9   4. Class 3 severe obesity due to excess calories with serious comorbidity and body mass index (BMI) of 40.0 to 44.9 in adult  E66.01 278.01    Z68.41 V85.41        Plan:     1. Elevated brain natriuretic peptide (BNP) level  -     BNP; Future; Expected date: 11/15/2023    2. SOB (shortness of breath) on exertion  -     BNP; Future; Expected date: 11/15/2023    3. Hypothyroidism, unspecified type  Assessment & Plan:  Lab Results   Component Value Date    TSH 1.1710 10/31/2022      Will recheck TSH today and adjust medications as needed.   Continue Levothyroxine 50 mcg tablet daily.   Take medicine on an empty stomach with water (no other medications or beverages). Wait 30 minutes to eat or drink.  Report any symptoms of thinning hair, breaking nails, fatigue, weight gain or loss, palpitations.       Orders:  -     TSH; Future; Expected date: 11/15/2023  -     Lipid Panel; Future; Expected date: 11/15/2023    4. Class 3 severe obesity due to excess calories with serious comorbidity and body mass index (BMI) of 40.0 to 44.9 in adult  Assessment & Plan:  Body mass index is 42.73 kg/m².  Goal BMI <30.  Exercise 5 times a week for 30 minutes per day.  Avoid soda, simple sugars, excessive rice, potatoes or bread. Limit fast foods and fried foods.  Choose complex carbs in moderation (example: green vegetables, beans, oatmeal). Eat plenty of fresh fruits and vegetables with lean meats daily.  Do not skip meals. Eat a balanced portion  size.  Avoid fad diets. Consider permanent healthy life style changes.       Orders:  -     Hemoglobin A1C; Future; Expected date: 11/15/2023       Follow up in about 6 months (around 5/15/2024) for Wellness. In addition to their scheduled follow up, the patient has also been instructed to follow up on as needed basis.   Future Appointments   Date Time Provider Department Center   5/17/2024  9:00 AM Jason Trujillo NP [facility]   11/4/2024 11:30 AM Amber Wasserman MD [facility] [address] Duarte Trujillo NP

## 2023-11-17 ENCOUNTER — TELEPHONE (OUTPATIENT)
Dept: FAMILY MEDICINE | Facility: CLINIC | Age: 63
End: 2023-11-17
Payer: COMMERCIAL

## 2023-11-17 ENCOUNTER — LAB VISIT (OUTPATIENT)
Dept: LAB | Facility: HOSPITAL | Age: 63
End: 2023-11-17
Payer: COMMERCIAL

## 2023-11-17 DIAGNOSIS — R79.89 ELEVATED BRAIN NATRIURETIC PEPTIDE (BNP) LEVEL: ICD-10-CM

## 2023-11-17 DIAGNOSIS — E66.01 CLASS 3 SEVERE OBESITY DUE TO EXCESS CALORIES WITH SERIOUS COMORBIDITY AND BODY MASS INDEX (BMI) OF 40.0 TO 44.9 IN ADULT: ICD-10-CM

## 2023-11-17 DIAGNOSIS — E03.9 HYPOTHYROIDISM, UNSPECIFIED TYPE: ICD-10-CM

## 2023-11-17 DIAGNOSIS — R06.02 SOB (SHORTNESS OF BREATH) ON EXERTION: ICD-10-CM

## 2023-11-17 LAB
BNP BLD-MCNC: 25.5 PG/ML
CHOLEST SERPL-MCNC: 203 MG/DL
CHOLEST/HDLC SERPL: 3 {RATIO} (ref 0–5)
EST. AVERAGE GLUCOSE BLD GHB EST-MCNC: 108.3 MG/DL
HBA1C MFR BLD: 5.4 %
HDLC SERPL-MCNC: 59 MG/DL (ref 35–60)
LDLC SERPL CALC-MCNC: 129 MG/DL (ref 50–140)
TRIGL SERPL-MCNC: 76 MG/DL (ref 37–140)
TSH SERPL-ACNC: 1.2 UIU/ML (ref 0.35–4.94)
VLDLC SERPL CALC-MCNC: 15 MG/DL

## 2023-11-17 PROCEDURE — 84443 ASSAY THYROID STIM HORMONE: CPT

## 2023-11-17 PROCEDURE — 36415 COLL VENOUS BLD VENIPUNCTURE: CPT

## 2023-11-17 PROCEDURE — 80061 LIPID PANEL: CPT

## 2023-11-17 PROCEDURE — 83036 HEMOGLOBIN GLYCOSYLATED A1C: CPT

## 2023-11-17 PROCEDURE — 83880 ASSAY OF NATRIURETIC PEPTIDE: CPT

## 2023-11-17 NOTE — TELEPHONE ENCOUNTER
----- Message from Jason Trujillo NP sent at 11/17/2023 10:21 AM CST -----  Cholesterol slightly elevated. Stress diet and exercise. Can take OTC fish oil to assist in lowering cholesterol.     TSH normal.   A1C 5.4 within normal range.   BNP is normal.

## 2024-04-11 ENCOUNTER — OFFICE VISIT (OUTPATIENT)
Dept: FAMILY MEDICINE | Facility: CLINIC | Age: 64
End: 2024-04-11
Payer: COMMERCIAL

## 2024-04-11 VITALS
RESPIRATION RATE: 18 BRPM | HEART RATE: 69 BPM | DIASTOLIC BLOOD PRESSURE: 91 MMHG | HEIGHT: 65 IN | SYSTOLIC BLOOD PRESSURE: 152 MMHG | TEMPERATURE: 97 F | WEIGHT: 250 LBS | BODY MASS INDEX: 41.65 KG/M2

## 2024-04-11 DIAGNOSIS — M51.36 DEGENERATION OF INTERVERTEBRAL DISC OF LUMBAR REGION: Primary | ICD-10-CM

## 2024-04-11 DIAGNOSIS — G56.02 CARPAL TUNNEL SYNDROME OF LEFT WRIST: ICD-10-CM

## 2024-04-11 PROCEDURE — 3080F DIAST BP >= 90 MM HG: CPT | Mod: CPTII,,, | Performed by: FAMILY MEDICINE

## 2024-04-11 PROCEDURE — 3008F BODY MASS INDEX DOCD: CPT | Mod: CPTII,,, | Performed by: FAMILY MEDICINE

## 2024-04-11 PROCEDURE — 3077F SYST BP >= 140 MM HG: CPT | Mod: CPTII,,, | Performed by: FAMILY MEDICINE

## 2024-04-11 PROCEDURE — 1160F RVW MEDS BY RX/DR IN RCRD: CPT | Mod: CPTII,,, | Performed by: FAMILY MEDICINE

## 2024-04-11 PROCEDURE — 1159F MED LIST DOCD IN RCRD: CPT | Mod: CPTII,,, | Performed by: FAMILY MEDICINE

## 2024-04-11 PROCEDURE — 99213 OFFICE O/P EST LOW 20 MIN: CPT | Mod: ,,, | Performed by: FAMILY MEDICINE

## 2024-04-11 RX ORDER — TIRZEPATIDE 2.5 MG/.5ML
2.5 INJECTION, SOLUTION SUBCUTANEOUS
COMMUNITY

## 2024-04-11 NOTE — PROGRESS NOTES
Patient ID: [id number]     Chief Complaint: insurance paperwork    HPI:     Angelique Martin is a 63 y.o. female here today for insurance paperwork. Since her last visit with me on 7/13/23, the patient has been diagnosed with carpal tunnel of her left hand (her dominant hand) and also had bilateral knee replacements.     -------------------------------------    Anxiety disorder, unspecified    Bronchial asthma    CAD (coronary artery disease)    Depression    Family history of ovarian cancer    sister    GERD (gastroesophageal reflux disease)    Hiatal hernia    History of pressure ulcer    Hypothyroidism, unspecified    Osteoarthritis of knee    Personal history of colonic polyps    s/p polypectomy - Dr Santa Howell, III    Postmenopausal state    Rheumatoid arthritis, unspecified    Thyroid disease    Vitamin D deficiency        Past Surgical History:   Procedure Laterality Date    ABLATION, FIBROID, UTERUS, LAPAROSCOPIC, WITH US GUIDANCE  2019    Dr Tyler Farrell    CHOLECYSTECTOMY  2010    COLONOSCOPY W/ BIOPSIES AND POLYPECTOMY  05/12/2020    Dr Santa Howell, III    COLONOSCOPY W/ BIOPSIES AND POLYPECTOMY  05/02/2023    Dr Santa Howell, III    DILATION AND CURETTAGE OF UTERUS  1981    Dr Tyler Farrell    ESOPHAGOGASTRODUODENOSCOPY  04/25/2019    Dr Santa Howell, III    ESOPHAGOGASTRODUODENOSCOPY  06/27/2023    Dr Santa Howell    HYSTERECTOMY, TOTAL, LAPAROSCOPIC, WITH SALPINGO-OOPHORECTOMY Bilateral 04/2022    Dr Tyler Farrell    SINUS SURGERY  2018    TONSILLECTOMY  1972    TOTAL KNEE ARTHROPLASTY Left 12/28/2021    Dr Abdelrahman Person    TUBAL LIGATION Bilateral 1988    Dr Tyler Farrell       Review of patient's allergies indicates:  No Known Allergies    Current Outpatient Medications   Medication Sig Dispense Refill    albuterol (PROVENTIL/VENTOLIN HFA) 90 mcg/actuation inhaler Inhale 1 puff into the lungs daily as needed.      alosetron (LOTRONEX) 1 mg tablet Take 0.5 mg by  mouth once daily.      aspirin (ECOTRIN) 81 MG EC tablet Take 1 tablet by mouth Daily.      calcium carbonate (CALCIUM 500 ORAL) Take 1 tablet by mouth once daily.      cyanocobalamin 1,000 mcg/mL injection 1 mL sc daily for 7 days, then weekly for 1 month, then monthly for 6 months 30 mL 1    esomeprazole (NEXIUM) 40 MG capsule Take 1 capsule by mouth Daily.      estradioL (ESTRACE) 1 MG tablet Take 1 tablet by mouth Daily.      glucosamine-chondroitin 500-400 mg tablet Take 1 tablet by mouth 2 (two) times a day.      levothyroxine (SYNTHROID) 50 MCG tablet Take 1 tablet by mouth Daily.      multivitamin capsule Take 1 capsule by mouth once daily.      vitamin D (VITAMIN D3) 1000 units Tab Take 1,000 Units by mouth Daily.      tirzepatide (MOUNJARO) 2.5 mg/0.5 mL PnIj Inject 2.5 mg into the skin every 7 days.       No current facility-administered medications for this visit.       Social History     Socioeconomic History    Marital status:    Tobacco Use    Smoking status: Never    Smokeless tobacco: Never   Substance and Sexual Activity    Alcohol use: Yes     Alcohol/week: 2.0 standard drinks of alcohol     Types: 1 Glasses of wine, 1 Cans of beer per week     Comment: Weekly    Drug use: Never    Sexual activity: Yes     Partners: Male     Birth control/protection: Post-menopausal, See Surgical Hx     Social Determinants of Health     Financial Resource Strain: Low Risk  (7/11/2023)    Overall Financial Resource Strain (CARDIA)     Difficulty of Paying Living Expenses: Not hard at all   Food Insecurity: No Food Insecurity (7/11/2023)    Hunger Vital Sign     Worried About Running Out of Food in the Last Year: Never true     Ran Out of Food in the Last Year: Never true   Transportation Needs: No Transportation Needs (7/11/2023)    PRAPARE - Transportation     Lack of Transportation (Medical): No     Lack of Transportation (Non-Medical): No   Physical Activity: Sufficiently Active  (7/11/2023)    Exercise Vital Sign     Days of Exercise per Week: 5 days     Minutes of Exercise per Session: 30 min   Stress: Stress Concern Present (7/11/2023)    [employer] - Occupational Stress Questionnaire     Feeling of Stress : To some extent   Social Connections: Socially Integrated (7/11/2023)    Social Connection and Isolation Panel [NHANES]     Frequency of Communication with Friends and Family: More than three times a week     Frequency of Social Gatherings with Friends and Family: Twice a week     Attends [employer] Services: More than 4 times per year     Active Member of Clubs or Organizations: Yes     Attends Club or Organization Meetings: 1 to 4 times per year     Marital Status:    Housing Stability: Low Risk  (7/11/2023)    Housing Stability Vital Sign     Unable to Pay for Housing in the Last Year: No     Number of Places Lived in the Last Year: 1     Unstable Housing in the Last Year: No        Family History   Problem Relation Name Age of Onset    Heart disease Mother Luann     Hypertension Mother Luann     Arthritis Mother Luann     Paranoid behavior Father      Pneumonia Father      Ovarian cancer Sister Janna     Depression Sister Janna     Anxiety disorder Sister Janna     Arthritis Brother Semaj         Patient Care Team:  Tadeo Ambriz DO as PCP - General (Family Medicine)  Santa Howell III, MD as Consulting Physician (Gastroenterology)  Kendall Vuong MD as Consulting Physician (Cardiology)  DAVID Person MD (Orthopedic Surgery)  Tyler Farrell MD as Consulting Physician (Obstetrics and Gynecology)  Maureen Gaming NP (Pulmonary Disease)  Amber Wasserman MD as Consulting Physician (Neurology)     Subjective:     Review of Systems   Constitutional:  Negative for chills and fever.   Respiratory:  Positive for shortness of breath.    Cardiovascular:  Negative for chest pain.   Gastrointestinal:  Negative  "for constipation and diarrhea.   Musculoskeletal:  Positive for back pain, joint pain and myalgias.   Neurological:  Positive for tingling. Negative for dizziness and headaches.   Psychiatric/Behavioral:  The patient does not have insomnia.        See HPI for details  All Other ROS: Negative except as stated in HPI.       Objective:     BP (!) 152/91 (BP Location: Left arm, Patient Position: Sitting, BP Method: Large (Automatic))   Pulse 69   Temp 97.1 °F (36.2 °C)   Resp 18   Ht 5' 5" (1.651 m)   Wt 113.4 kg (250 lb)   BMI 41.60 kg/m²     Physical Exam  Vitals reviewed.   Constitutional:       General: She is not in acute distress.     Appearance: Normal appearance.   Cardiovascular:      Rate and Rhythm: Normal rate and regular rhythm.      Heart sounds: No murmur heard.     No friction rub. No gallop.   Pulmonary:      Effort: No respiratory distress.      Breath sounds: No wheezing, rhonchi or rales.   Musculoskeletal:         General: Tenderness present. No swelling or deformity.      Right lower leg: No edema.      Left lower leg: No edema.      Comments: Tenderness to palpation of left hand and wrist as well as lumbar spine bilaterally.    Skin:     General: Skin is warm and dry.      Findings: No lesion or rash.   Neurological:      General: No focal deficit present.      Mental Status: She is alert.      Motor: Weakness present.      Comments: Weakness of motor strength of left hand compared to right. (4/5).    Psychiatric:         Mood and Affect: Mood normal.         Assessment/Plan:     1. Degeneration of intervertebral disc of lumbar region  -     X-Ray Lumbar Spine AP And Lateral; Future; Expected date: 04/11/2024    2. Carpal tunnel syndrome of left wrist      Patient unable to perform sustained activity using her left hand/wrist due to carpal tunnel syndrome. This include fine motor activity. Due to degenerative disease in the lumbar spine, she is unable to stand or walk for prolonged periods " or lift, push, or pull anything weight greater than 15lbs.   Follow up:     Follow up if symptoms worsen or fail to improve. In addition to their scheduled follow up, the patient has also been instructed to follow up on as needed basis.

## 2024-04-12 ENCOUNTER — HOSPITAL ENCOUNTER (OUTPATIENT)
Dept: RADIOLOGY | Facility: HOSPITAL | Age: 64
Discharge: HOME | End: 2024-04-12
Attending: FAMILY MEDICINE
Payer: COMMERCIAL

## 2024-04-12 DIAGNOSIS — M51.36 DEGENERATION OF INTERVERTEBRAL DISC OF LUMBAR REGION: ICD-10-CM

## 2024-04-12 PROCEDURE — 72100 X-RAY EXAM L-S SPINE 2/3 VWS: CPT | Mod: TC

## 2024-05-29 DIAGNOSIS — Z00.00 WELLNESS EXAMINATION: Primary | ICD-10-CM

## 2024-05-29 DIAGNOSIS — Z11.4 ENCOUNTER FOR SCREENING FOR HIV: ICD-10-CM

## 2024-05-29 DIAGNOSIS — Z11.59 NEED FOR HEPATITIS C SCREENING TEST: ICD-10-CM

## 2024-05-29 DIAGNOSIS — E03.9 HYPOTHYROIDISM, UNSPECIFIED TYPE: ICD-10-CM

## 2024-06-03 ENCOUNTER — LAB VISIT (OUTPATIENT)
Dept: LAB | Facility: HOSPITAL | Age: 64
End: 2024-06-03
Payer: MEDICARE

## 2024-06-03 DIAGNOSIS — Z11.4 ENCOUNTER FOR SCREENING FOR HIV: ICD-10-CM

## 2024-06-03 DIAGNOSIS — Z00.00 WELLNESS EXAMINATION: ICD-10-CM

## 2024-06-03 DIAGNOSIS — E03.9 HYPOTHYROIDISM, UNSPECIFIED TYPE: ICD-10-CM

## 2024-06-03 DIAGNOSIS — Z11.59 NEED FOR HEPATITIS C SCREENING TEST: ICD-10-CM

## 2024-06-03 LAB
25(OH)D3+25(OH)D2 SERPL-MCNC: 50 NG/ML (ref 30–80)
ALBUMIN SERPL-MCNC: 3.7 G/DL (ref 3.4–4.8)
ALBUMIN/GLOB SERPL: 1.2 RATIO (ref 1.1–2)
ALP SERPL-CCNC: 73 UNIT/L (ref 40–150)
ALT SERPL-CCNC: 14 UNIT/L (ref 0–55)
ANION GAP SERPL CALC-SCNC: 9 MEQ/L
AST SERPL-CCNC: 13 UNIT/L (ref 5–34)
BASOPHILS # BLD AUTO: 0.02 X10(3)/MCL
BASOPHILS NFR BLD AUTO: 0.4 %
BILIRUB SERPL-MCNC: 0.4 MG/DL
BUN SERPL-MCNC: 12 MG/DL (ref 9.8–20.1)
CALCIUM SERPL-MCNC: 9.9 MG/DL (ref 8.4–10.2)
CHLORIDE SERPL-SCNC: 108 MMOL/L (ref 98–107)
CHOLEST SERPL-MCNC: 205 MG/DL
CHOLEST/HDLC SERPL: 4 {RATIO} (ref 0–5)
CO2 SERPL-SCNC: 25 MMOL/L (ref 23–31)
CREAT SERPL-MCNC: 0.8 MG/DL (ref 0.55–1.02)
CREAT/UREA NIT SERPL: 15
EOSINOPHIL # BLD AUTO: 0.08 X10(3)/MCL (ref 0–0.9)
EOSINOPHIL NFR BLD AUTO: 1.6 %
ERYTHROCYTE [DISTWIDTH] IN BLOOD BY AUTOMATED COUNT: 15.8 % (ref 11.5–17)
GFR SERPLBLD CREATININE-BSD FMLA CKD-EPI: >60 ML/MIN/1.73/M2
GLOBULIN SER-MCNC: 3.2 GM/DL (ref 2.4–3.5)
GLUCOSE SERPL-MCNC: 96 MG/DL (ref 82–115)
HCT VFR BLD AUTO: 41.7 % (ref 37–47)
HCV AB SERPL QL IA: NONREACTIVE
HDLC SERPL-MCNC: 49 MG/DL (ref 35–60)
HGB BLD-MCNC: 13.4 G/DL (ref 12–16)
HIV 1+2 AB+HIV1 P24 AG SERPL QL IA: NONREACTIVE
IMM GRANULOCYTES # BLD AUTO: 0.01 X10(3)/MCL (ref 0–0.04)
IMM GRANULOCYTES NFR BLD AUTO: 0.2 %
LDLC SERPL CALC-MCNC: 126 MG/DL (ref 50–140)
LYMPHOCYTES # BLD AUTO: 1.33 X10(3)/MCL (ref 0.6–4.6)
LYMPHOCYTES NFR BLD AUTO: 27 %
MCH RBC QN AUTO: 27.9 PG (ref 27–31)
MCHC RBC AUTO-ENTMCNC: 32.1 G/DL (ref 33–36)
MCV RBC AUTO: 86.9 FL (ref 80–94)
MONOCYTES # BLD AUTO: 0.41 X10(3)/MCL (ref 0.1–1.3)
MONOCYTES NFR BLD AUTO: 8.3 %
NEUTROPHILS # BLD AUTO: 3.08 X10(3)/MCL (ref 2.1–9.2)
NEUTROPHILS NFR BLD AUTO: 62.5 %
NRBC BLD AUTO-RTO: 0 %
PLATELET # BLD AUTO: 362 X10(3)/MCL (ref 130–400)
PMV BLD AUTO: 8.5 FL (ref 7.4–10.4)
POTASSIUM SERPL-SCNC: 4.2 MMOL/L (ref 3.5–5.1)
PROT SERPL-MCNC: 6.9 GM/DL (ref 5.8–7.6)
RBC # BLD AUTO: 4.8 X10(6)/MCL (ref 4.2–5.4)
SODIUM SERPL-SCNC: 142 MMOL/L (ref 136–145)
TRIGL SERPL-MCNC: 151 MG/DL (ref 37–140)
TSH SERPL-ACNC: 1.3 UIU/ML (ref 0.35–4.94)
VLDLC SERPL CALC-MCNC: 30 MG/DL
WBC # SPEC AUTO: 4.93 X10(3)/MCL (ref 4.5–11.5)

## 2024-06-03 PROCEDURE — 87389 HIV-1 AG W/HIV-1&-2 AB AG IA: CPT

## 2024-06-03 PROCEDURE — 84443 ASSAY THYROID STIM HORMONE: CPT

## 2024-06-03 PROCEDURE — 86803 HEPATITIS C AB TEST: CPT

## 2024-06-03 PROCEDURE — 80053 COMPREHEN METABOLIC PANEL: CPT

## 2024-06-03 PROCEDURE — 80061 LIPID PANEL: CPT

## 2024-06-03 PROCEDURE — 85025 COMPLETE CBC W/AUTO DIFF WBC: CPT

## 2024-06-03 PROCEDURE — 82306 VITAMIN D 25 HYDROXY: CPT | Mod: GA

## 2024-06-03 PROCEDURE — 36415 COLL VENOUS BLD VENIPUNCTURE: CPT

## 2024-06-05 ENCOUNTER — LAB VISIT (OUTPATIENT)
Dept: LAB | Facility: HOSPITAL | Age: 64
End: 2024-06-05
Payer: MEDICARE

## 2024-06-05 ENCOUNTER — OFFICE VISIT (OUTPATIENT)
Dept: FAMILY MEDICINE | Facility: CLINIC | Age: 64
End: 2024-06-05
Payer: MEDICARE

## 2024-06-05 VITALS
TEMPERATURE: 98 F | DIASTOLIC BLOOD PRESSURE: 75 MMHG | RESPIRATION RATE: 20 BRPM | HEIGHT: 65 IN | OXYGEN SATURATION: 96 % | SYSTOLIC BLOOD PRESSURE: 124 MMHG | WEIGHT: 246 LBS | HEART RATE: 63 BPM | BODY MASS INDEX: 40.98 KG/M2

## 2024-06-05 DIAGNOSIS — Z00.00 MEDICARE ANNUAL WELLNESS VISIT, SUBSEQUENT: Primary | ICD-10-CM

## 2024-06-05 DIAGNOSIS — E03.9 HYPOTHYROIDISM, UNSPECIFIED TYPE: ICD-10-CM

## 2024-06-05 DIAGNOSIS — M54.50 ACUTE MIDLINE LOW BACK PAIN WITHOUT SCIATICA: ICD-10-CM

## 2024-06-05 DIAGNOSIS — Z86.39 HISTORY OF THYROID NODULE: ICD-10-CM

## 2024-06-05 DIAGNOSIS — E78.2 MIXED HYPERLIPIDEMIA: ICD-10-CM

## 2024-06-05 DIAGNOSIS — M62.838 MUSCLE SPASM: ICD-10-CM

## 2024-06-05 DIAGNOSIS — E66.01 CLASS 3 SEVERE OBESITY DUE TO EXCESS CALORIES WITH SERIOUS COMORBIDITY AND BODY MASS INDEX (BMI) OF 40.0 TO 44.9 IN ADULT: ICD-10-CM

## 2024-06-05 LAB
BILIRUB UR QL STRIP.AUTO: NEGATIVE
CLARITY UR: CLEAR
COLOR UR AUTO: YELLOW
GLUCOSE UR QL STRIP: NEGATIVE
HGB UR QL STRIP: NEGATIVE
KETONES UR QL STRIP: NEGATIVE
LEUKOCYTE ESTERASE UR QL STRIP: NEGATIVE
NITRITE UR QL STRIP: NEGATIVE
PH UR STRIP: 6 [PH]
PROT UR QL STRIP: NEGATIVE
SP GR UR STRIP.AUTO: 1.02 (ref 1–1.03)
UROBILINOGEN UR STRIP-ACNC: 0.2

## 2024-06-05 PROCEDURE — 81003 URINALYSIS AUTO W/O SCOPE: CPT

## 2024-06-05 PROCEDURE — 99213 OFFICE O/P EST LOW 20 MIN: CPT | Mod: 25,,,

## 2024-06-05 PROCEDURE — G0439 PPPS, SUBSEQ VISIT: HCPCS | Mod: ,,,

## 2024-06-05 NOTE — PROGRESS NOTES
Patient ID: [id number]     Chief Complaint: Medicare Annual Wellness     HPI:     Angelique Martin is a 63 y.o. female here today for a Medicare Annual Wellness visit and comprehensive Health Risk Assessment.     A separate E/M code has been provided to evaluate additional complaints that the patient would like addressed during the dedicated Medicare Wellness Exam.    The patient reports that she has been having low back pain for a couple of weeks. She mentions that the pain is in the middle of her back, around the flank area. She wants to get tested for a urinary tract infection as she has had UTIs in the past. She denies experiencing any pain while urinating, abdominal pain, or fevers.    Health Maintenance   Topic Date Due    Shingles Vaccine (1 of 2) Never done    Mammogram  09/07/2024    Colorectal Cancer Screening  05/02/2028    Lipid Panel  06/03/2029    TETANUS VACCINE  07/08/2031    Hepatitis C Screening  Completed        Past Medical History:   Diagnosis Date    Anxiety disorder, unspecified     Bronchial asthma     CAD (coronary artery disease)     Cervicalgia     Depression     Family history of ovarian cancer     sister    GERD (gastroesophageal reflux disease)     Hiatal hernia     History of pressure ulcer     Hypothyroidism, unspecified     Osteoarthritis of knee     Personal history of colonic polyps 05/02/2023    s/p polypectomy - Dr Santa Howell, III    Postmenopausal state     Rheumatoid arthritis, unspecified     Thyroid disease     Vitamin D deficiency         Past Surgical History:   Procedure Laterality Date    ABLATION, FIBROID, UTERUS, LAPAROSCOPIC, WITH US GUIDANCE  2019    Dr Tyler Farrell    CHOLECYSTECTOMY  2010    COLONOSCOPY W/ BIOPSIES AND POLYPECTOMY  05/12/2020    Dr Santa Howell, III    COLONOSCOPY W/ BIOPSIES AND POLYPECTOMY  05/02/2023    Dr Santa Howell, III    DILATION AND CURETTAGE OF UTERUS  1981    Dr Tyler Farrell    ESOPHAGOGASTRODUODENOSCOPY  04/25/2019    Dr Pratt  Dante, III    ESOPHAGOGASTRODUODENOSCOPY  06/27/2023    Dr Santa Howell    HYSTERECTOMY, TOTAL, LAPAROSCOPIC, WITH SALPINGO-OOPHORECTOMY Bilateral 04/2022    Dr Tyler Farrell    SINUS SURGERY  2018    TONSILLECTOMY  1972    TOTAL KNEE ARTHROPLASTY Left 12/28/2021    Dr Abdelrahman Person    TUBAL LIGATION Bilateral 1988    Dr Tyler Farrell        Social History     Socioeconomic History    Marital status:    Tobacco Use    Smoking status: Never    Smokeless tobacco: Never   Substance and Sexual Activity    Alcohol use: Yes     Alcohol/week: 2.0 standard drinks of alcohol     Types: 1 Glasses of wine, 1 Cans of beer per week     Comment: Weekly    Drug use: Never    Sexual activity: Yes     Partners: Male     Birth control/protection: Post-menopausal, See Surgical Hx     Social Determinants of Health     Financial Resource Strain: Low Risk  (7/11/2023)    Overall Financial Resource Strain (CARDIA)     Difficulty of Paying Living Expenses: Not hard at all   Food Insecurity: No Food Insecurity (7/11/2023)    Hunger Vital Sign     Worried About Running Out of Food in the Last Year: Never true     Ran Out of Food in the Last Year: Never true   Transportation Needs: No Transportation Needs (7/11/2023)    PRAPARE - Transportation     Lack of Transportation (Medical): No     Lack of Transportation (Non-Medical): No   Physical Activity: Sufficiently Active (7/11/2023)    Exercise Vital Sign     Days of Exercise per Week: 5 days     Minutes of Exercise per Session: 30 min   Stress: Stress Concern Present (7/11/2023)    [employer] - Occupational Stress Questionnaire     Feeling of Stress : To some extent   Housing Stability: Low Risk  (7/11/2023)    Housing Stability Vital Sign     Unable to Pay for Housing in the Last Year: No     Number of Places Lived in the Last Year: 1     Unstable Housing in the Last Year: No        Family History   Problem Relation Name Age of Onset    Heart disease  Mother Luann     Hypertension Mother Luann     Arthritis Mother Luann     Paranoid behavior Father      Pneumonia Father      Ovarian cancer Sister Janna     Depression Sister Janna     Anxiety disorder Sister Janna     Arthritis Brother Semaj         Current Outpatient Medications   Medication Instructions    albuterol (PROVENTIL/VENTOLIN HFA) 90 mcg/actuation inhaler 1 puff, Inhalation, Daily PRN    alosetron (LOTRONEX) 0.5 mg, Oral, Daily    aspirin (ECOTRIN) 81 MG EC tablet 1 tablet, Oral, Daily    calcium carbonate (CALCIUM 500 ORAL) 1 tablet, Oral, Daily    cyanocobalamin 1,000 mcg/mL injection 1 mL sc daily for 7 days, then weekly for 1 month, then monthly for 6 months    cyclobenzaprine (FLEXERIL) 5 mg, Oral, 3 times daily PRN    esomeprazole (NEXIUM) 40 MG capsule 1 capsule, Oral, Daily    estradioL (ESTRACE) 1 MG tablet 1 tablet, Oral, Daily    glucosamine-chondroitin 500-400 mg tablet 1 tablet, Oral, 2 times daily    levothyroxine (SYNTHROID) 50 MCG tablet 1 tablet, Oral, Daily    MOUNJARO 2.5 mg, Subcutaneous, Every 7 days    multivitamin capsule 1 capsule, Oral, Daily    vitamin D (VITAMIN D3) 1,000 Units, Oral, Daily       Review of patient's allergies indicates:  No Known Allergies     Immunization History   Administered Date(s) Administered    COVID-19, MRNA, LN-S, PF (Pfizer) (Purple Cap) 03/20/2021, 04/10/2021, 12/03/2021    Influenza - Quadrivalent - PF *Preferred* (6 months and older) 10/21/2021, 10/31/2022    Influenza - Trivalent (ADULT) 01/01/2019, 09/01/2019    Tdap 07/08/2021        Patient Care Team:  Tadeo Ambriz DO as PCP - General (Family Medicine)  Santa Howell III, MD as Consulting Physician (Gastroenterology)  Kendall Vuong MD as Consulting Physician (Cardiology)  DAVID Person MD (Orthopedic Surgery)  Tyler Farrell MD as Consulting Physician (Obstetrics and Gynecology)  Maureen Gaming NP (Pulmonary Disease)  Amber Wasserman MD as Consulting Physician  "(Neurology)    Subjective:     Review of Systems    12 point review of systems conducted, negative except as stated in the history of present illness. See HPI for details.    Objective:     Visit Vitals  /75 (BP Location: Right arm, Patient Position: Sitting, BP Method: Large (Automatic))   Pulse 63   Temp 97.8 °F (36.6 °C)   Resp 20   Ht 5' 5" (1.651 m)   Wt 111.6 kg (246 lb)   SpO2 96%   BMI 40.94 kg/m²       Physical Exam  Vitals and nursing note reviewed.   Constitutional:       General: She is not in acute distress.     Appearance: She is not ill-appearing.   HENT:      Head: Normocephalic and atraumatic.      Mouth/Throat:      Mouth: Mucous membranes are moist.      Pharynx: Oropharynx is clear.   Eyes:      General: No scleral icterus.     Extraocular Movements: Extraocular movements intact.      Conjunctiva/sclera: Conjunctivae normal.      Pupils: Pupils are equal, round, and reactive to light.   Neck:      Vascular: No carotid bruit.   Cardiovascular:      Rate and Rhythm: Normal rate and regular rhythm.      Heart sounds: No murmur heard.     No friction rub. No gallop.   Pulmonary:      Effort: Pulmonary effort is normal. No respiratory distress.      Breath sounds: Normal breath sounds. No wheezing, rhonchi or rales.   Abdominal:      General: Abdomen is flat. Bowel sounds are normal. There is no distension.      Palpations: Abdomen is soft. There is no mass.      Tenderness: There is no abdominal tenderness.   Musculoskeletal:         General: Normal range of motion.      Cervical back: Normal range of motion and neck supple.      Thoracic back: Tenderness present.        Back:    Skin:     General: Skin is warm and dry.   Neurological:      General: No focal deficit present.      Mental Status: She is alert.   Psychiatric:         Mood and Affect: Mood normal.         Labs Reviewed:     Chemistry:  Lab Results   Component Value Date     06/03/2024    K 4.2 06/03/2024    BUN 12.0 " 06/03/2024    CREATININE 0.80 06/03/2024    EGFRNORACEVR >60 06/03/2024    GLUCOSE 96 06/03/2024    CALCIUM 9.9 06/03/2024    ALKPHOS 73 06/03/2024    LABPROT 6.9 06/03/2024    ALBUMIN 3.7 06/03/2024    BILIDIR 0.1 11/17/2021    IBILI 0.20 11/17/2021    AST 13 06/03/2024    ALT 14 06/03/2024    WSNPMOBV74CA 50 06/03/2024    TSH 1.302 06/03/2024        Lab Results   Component Value Date    HGBA1C 5.4 11/17/2023        Hematology:  Lab Results   Component Value Date    WBC 4.93 06/03/2024    HGB 13.4 06/03/2024    HCT 41.7 06/03/2024     06/03/2024       Lipid Panel:  Lab Results   Component Value Date    CHOL 205 (H) 06/03/2024    HDL 49 06/03/2024    .00 06/03/2024    TRIG 151 (H) 06/03/2024    TOTALCHOLEST 4 06/03/2024        Urine:  Lab Results   Component Value Date    APPEARANCEUA Clear 06/05/2024    SGUA 1.025 06/05/2024    PROTEINUA Negative 06/05/2024    KETONESUA Negative 06/05/2024    LEUKOCYTESUR Negative 06/05/2024    RBCUA <5 06/21/2023    WBCUA <5 06/21/2023    BACTERIA None Seen 06/21/2023        Assessment:       ICD-10-CM ICD-9-CM   1. Medicare annual wellness visit, subsequent  Z00.00 V70.0   2. Acute midline low back pain without sciatica  M54.50 724.2   3. Hypothyroidism, unspecified type  E03.9 244.9   4. History of thyroid nodule  Z86.39 V12.29   5. Muscle spasm  M62.838 728.85   6. Mixed hyperlipidemia  E78.2 272.2   7. Class 3 severe obesity due to excess calories with serious comorbidity and body mass index (BMI) of 40.0 to 44.9 in adult  E66.01 278.01    Z68.41 V85.41      Plan:     1. Medicare annual wellness visit, subsequent    2. Acute midline low back pain without sciatica  -     Urinalysis, Reflex to Urine Culture; Future; Expected date: 06/05/2024    3. Hypothyroidism, unspecified type  -     US Soft Tissue Head Neck; Future; Expected date: 06/05/2024    4. History of thyroid nodule  -     US Soft Tissue Head Neck; Future; Expected date: 06/05/2024    5. Muscle spasm  -      cyclobenzaprine (FLEXERIL) 5 MG tablet; Take 1 tablet (5 mg total) by mouth 3 (three) times daily as needed for Muscle spasms.  Dispense: 15 tablet; Refill: 0    6. Mixed hyperlipidemia  Assessment & Plan:  Lab Results   Component Value Date    .00 06/03/2024    TRIG 151 (H) 06/03/2024    HDL 49 06/03/2024    TOTALCHOLEST 4 06/03/2024     Stressed importance of dietary modifications. Follow a low cholesterol, low saturated fat diet with less that 200mg of cholesterol a day.  Avoid fried foods and high saturated fats (high saturated fats less than 7% of calories).  Add Flax Seed/Fish Oil supplements to diet. Increase dietary fiber.  Regular exercise can reduce LDL and raise HDL. Stressed importance of physical activity 5 times per week for 30 minutes per day.       7. Class 3 severe obesity due to excess calories with serious comorbidity and body mass index (BMI) of 40.0 to 44.9 in adult  Assessment & Plan:  The patient informed me that she has recently commenced treatment with Mounjaro through an online facility. I provided the patient with information regarding the significance of monitoring kidney function and thyroid levels during the course of this medication. The patient verbalized understanding.     Body mass index is 40.94 kg/m².  Goal BMI <30.  Exercise 5 times a week for 30 minutes per day.  Avoid soda, simple sugars, excessive rice, potatoes or bread. Limit fast foods and fried foods.  Choose complex carbs in moderation (example: green vegetables, beans, oatmeal). Eat plenty of fresh fruits and vegetables with lean meats daily.  Do not skip meals. Eat a balanced portion size.  Avoid fad diets. Consider permanent healthy life style changes.         The following assessments were completed and reviewed. See completed screening forms and assessments within the Encounter Summary.  [x] Health Risk Assessment   [x] CVD Risk Factors - Reviewed  [x] Obesity/Physical Activity -  Encouraged daily 30 minute  physical activity x 5 days per week.   [x] Home Safety/Living Situation  [x] CAGE  [x] Depression (PHQ) Screen  [x] Timed Get Up and Go  [x] Whisper Test  [x] Cognitive Function/Impairment Screen  [x] Nutrition Screening  [x] ADL Screen  [x] Opioid Screen:  [x] Patient does not have a prescription for opioids.   [] Patient has a prescription for opioids but is at low risk for abuse.   [x] Substance Abuse Screen:   [x] Patient does not use substances.   [x] Advanced Care Planning:  Advance Care Planning   Date: 06/05/2024    Living Will  During this visit, I engaged the patient  in the voluntary advance care planning process.  The patient and I reviewed the role for advance directives and their purpose in directing future healthcare if the patient's unable to speak for him/herself.  At this point in time, the patient does have full decision-making capacity.  We discussed different extreme health states that she could experience, and reviewed what kind of medical care she would want in those situations.  The patient communicated that if she were comatose and had little chance of a meaningful recovery, she would not want machines/life-sustaining treatments used. I spent a total of 5 minutes engaging the patient in this advance care planning discussion.      Provided patient with a 5-10 year written screening schedule and personal prevention plan. Recommendations were developed using the USPSTF age appropriate recommendations. Education, counseling, and referrals were provided as needed. After Visit Summary printed and given to patient, which includes a list of additional screenings\tests needed.    Follow up in about 6 months (around 12/5/2024) for Follow Up. In addition to their scheduled follow up, the patient has also been instructed to follow up on as needed basis.     Future Appointments   Date Time Provider Department Center   6/7/2024  9:30 AM [address] [facility]   11/4/2024 11:30 AM Amber Wasserman  MD [facility] [address]   12/5/2024 10:40 AM Jason Trujillo NP [facility]   6/6/2025 10:00 AM Jason Trujillo NP [facility] Ka[facility] Franklin, NP

## 2024-06-06 ENCOUNTER — TELEPHONE (OUTPATIENT)
Dept: FAMILY MEDICINE | Facility: CLINIC | Age: 64
End: 2024-06-06
Payer: MEDICARE

## 2024-06-06 PROBLEM — E78.2 MIXED HYPERLIPIDEMIA: Status: ACTIVE | Noted: 2024-06-06

## 2024-06-06 RX ORDER — CYCLOBENZAPRINE HCL 5 MG
5 TABLET ORAL 3 TIMES DAILY PRN
Qty: 15 TABLET | Refills: 0 | Status: SHIPPED | OUTPATIENT
Start: 2024-06-06 | End: 2024-06-11

## 2024-06-06 NOTE — ASSESSMENT & PLAN NOTE
The patient informed me that she has recently commenced treatment with Mounjaro through an online facility. I provided the patient with information regarding the significance of monitoring kidney function and thyroid levels during the course of this medication. The patient verbalized understanding.     Body mass index is 40.94 kg/m².  Goal BMI <30.  Exercise 5 times a week for 30 minutes per day.  Avoid soda, simple sugars, excessive rice, potatoes or bread. Limit fast foods and fried foods.  Choose complex carbs in moderation (example: green vegetables, beans, oatmeal). Eat plenty of fresh fruits and vegetables with lean meats daily.  Do not skip meals. Eat a balanced portion size.  Avoid fad diets. Consider permanent healthy life style changes.

## 2024-06-06 NOTE — TELEPHONE ENCOUNTER
Patient asked if she can have a few muscle relaxer's for the pulled muscle in her back. If so can you please send to [facility]

## 2024-06-06 NOTE — TELEPHONE ENCOUNTER
After speaking with Jason she recommended she follow up with the ortho she seen already for her back and have them send in a muscle relaxer

## 2024-06-06 NOTE — TELEPHONE ENCOUNTER
----- Message from Jason Trujillo NP sent at 6/6/2024  8:11 AM CDT -----  Urinalysis is negative.

## 2024-06-06 NOTE — ASSESSMENT & PLAN NOTE
Lab Results   Component Value Date    .00 06/03/2024    TRIG 151 (H) 06/03/2024    HDL 49 06/03/2024    TOTALCHOLEST 4 06/03/2024     Stressed importance of dietary modifications. Follow a low cholesterol, low saturated fat diet with less that 200mg of cholesterol a day.  Avoid fried foods and high saturated fats (high saturated fats less than 7% of calories).  Add Flax Seed/Fish Oil supplements to diet. Increase dietary fiber.  Regular exercise can reduce LDL and raise HDL. Stressed importance of physical activity 5 times per week for 30 minutes per day.

## 2024-06-07 ENCOUNTER — HOSPITAL ENCOUNTER (OUTPATIENT)
Dept: RADIOLOGY | Facility: HOSPITAL | Age: 64
Discharge: HOME | End: 2024-06-07
Payer: MEDICARE

## 2024-06-07 DIAGNOSIS — E03.9 HYPOTHYROIDISM, UNSPECIFIED TYPE: ICD-10-CM

## 2024-06-07 DIAGNOSIS — Z86.39 HISTORY OF THYROID NODULE: ICD-10-CM

## 2024-06-07 PROCEDURE — 76536 US EXAM OF HEAD AND NECK: CPT | Mod: TC

## 2024-06-10 ENCOUNTER — TELEPHONE (OUTPATIENT)
Dept: FAMILY MEDICINE | Facility: CLINIC | Age: 64
End: 2024-06-10
Payer: MEDICARE

## 2024-06-10 NOTE — TELEPHONE ENCOUNTER
----- Message from Jason Trujillo NP sent at 6/7/2024 12:07 PM CDT -----  Please inform patient of lab results.     1. The patient has a thyroid nodule but it does not meet criteria for biopsy or surveillance.     Thanks for all you do,   Jason

## 2024-09-24 LAB — BCS RECOMMENDATION EXT: NORMAL

## 2024-09-26 ENCOUNTER — DOCUMENTATION ONLY (OUTPATIENT)
Dept: FAMILY MEDICINE | Facility: CLINIC | Age: 64
End: 2024-09-26
Payer: MEDICARE

## 2024-09-26 LAB — BMD RECOMMENDATION EXT: NORMAL

## 2024-10-22 ENCOUNTER — OFFICE VISIT (OUTPATIENT)
Dept: NEUROLOGY | Facility: CLINIC | Age: 64
End: 2024-10-22
Payer: MEDICARE

## 2024-10-22 VITALS
HEART RATE: 77 BPM | DIASTOLIC BLOOD PRESSURE: 78 MMHG | BODY MASS INDEX: 41.26 KG/M2 | HEIGHT: 65 IN | SYSTOLIC BLOOD PRESSURE: 123 MMHG | WEIGHT: 247.63 LBS

## 2024-10-22 DIAGNOSIS — G31.84 MILD COGNITIVE IMPAIRMENT: Primary | ICD-10-CM

## 2024-10-22 PROCEDURE — 99214 OFFICE O/P EST MOD 30 MIN: CPT | Mod: S$PBB,,, | Performed by: PSYCHIATRY & NEUROLOGY

## 2024-10-22 PROCEDURE — 99213 OFFICE O/P EST LOW 20 MIN: CPT | Mod: PBBFAC | Performed by: PSYCHIATRY & NEUROLOGY

## 2024-10-22 PROCEDURE — 99999 PR PBB SHADOW E&M-EST. PATIENT-LVL III: CPT | Mod: PBBFAC,,, | Performed by: PSYCHIATRY & NEUROLOGY

## 2024-10-22 RX ORDER — DONEPEZIL HYDROCHLORIDE 5 MG/1
5 TABLET, FILM COATED ORAL NIGHTLY
Qty: 30 TABLET | Refills: 11 | Status: SHIPPED | OUTPATIENT
Start: 2024-10-22 | End: 2025-10-22

## 2024-10-22 NOTE — PROGRESS NOTES
Chief Complaint   Patient presents with    Memory Loss     Pt denies progression of memory since last OV           This is a 64 y.o. female here for follow up for  for MCI. She is here today by herself. She says she feels she is overall stable since last visit. She has not noticed any worsening in her short-term memory. Recall, for the past 5 years, she is been having difficulty with things like people's names, places she has been, and having to double check herself frequently on tasks.  She says that no family or friends have noted any worsening in her cognition over the last year. She is sleeping well. No tremor or falls. No problems navigating while driving.     Medication List with Changes/Refills   New Medications    DONEPEZIL (ARICEPT) 5 MG TABLET    Take 1 tablet (5 mg total) by mouth every evening.   Current Medications    ALBUTEROL (PROVENTIL/VENTOLIN HFA) 90 MCG/ACTUATION INHALER    Inhale 1 puff into the lungs daily as needed.    ALOSETRON (LOTRONEX) 1 MG TABLET    Take 0.5 mg by mouth once daily.    ASPIRIN (ECOTRIN) 81 MG EC TABLET    Take 1 tablet by mouth Daily.    CALCIUM CARBONATE (CALCIUM 500 ORAL)    Take 1 tablet by mouth once daily.    CYANOCOBALAMIN 1,000 MCG/ML INJECTION    1 mL sc daily for 7 days, then weekly for 1 month, then monthly for 6 months    ESOMEPRAZOLE (NEXIUM) 40 MG CAPSULE    Take 1 capsule by mouth Daily.    ESTRADIOL (ESTRACE) 1 MG TABLET    Take 1 tablet by mouth Daily.    GLUCOSAMINE-CHONDROITIN 500-400 MG TABLET    Take 1 tablet by mouth 2 (two) times a day.    LEVOTHYROXINE (SYNTHROID) 50 MCG TABLET    Take 1 tablet by mouth Daily.    MULTIVITAMIN CAPSULE    Take 1 capsule by mouth once daily.    TIRZEPATIDE (MOUNJARO) 2.5 MG/0.5 ML PNIJ    Inject 2.5 mg into the skin every 7 days.    VITAMIN D (VITAMIN D3) 1000 UNITS TAB    Take 1,000 Units by mouth Daily.        Vitals:    10/22/24 1009   BP: 123/78   Pulse: 77        General: alert and oriented, no acute distress, no  audible wheezes, pulse intact, no edema    Cognition and Comprehension  Speech and language intact  Follows commands  Speech fluent  Attention intact  Memory for recent events intact from history taking  Affect pleasant  Fund of knowledge adequate    Does not have glasses to see first part of test but missed 0/5 on recall    Cranial nerves  II. Optic: Visual fields full to confrontation both eyes  III, IV, VI. Oculomotor: Intact, Pupils equal, round and reactive to light, no nystagmus  V. Trigeminal: sensation to light touch normal  VII. No facial asymmetry or facial weakness  VIII. Hearing intact to spoken voice  IX/X. Glossopharyngeal/Vagus: Voice normal, palate rises symmetrically  XI. Axillary: Shoulder shrug normal  XII. Hypoglossal: Intact    Muscle Strength and Tone  Normal upper extremity tone  Normal lower extremity tone  Normal upper extremity strength  Normal lower extremity strength        Coordination and Gait  Finger to nose normal  Gait normal     1. Mild cognitive impairment  Overview:  Initially evaluated October 2022 were short-term memory difficulty that started about 5 years prior to that.  MRI brain was ordered and reviewed by Dr. Wasserman and showed age-related changes.  TSH 1.1, B12 393.  She started B12 injections and now takes monthly sq B12.    MOCA scores:  10/2022: 30/30  10/2023: 26/30    Orders:  -     donepeziL (ARICEPT) 5 MG tablet; Take 1 tablet (5 mg total) by mouth every evening.  Dispense: 30 tablet; Refill: 11       Has some GI issues so starting only low dose aricept

## 2024-10-25 ENCOUNTER — OFFICE VISIT (OUTPATIENT)
Dept: FAMILY MEDICINE | Facility: CLINIC | Age: 64
End: 2024-10-25
Payer: COMMERCIAL

## 2024-10-25 ENCOUNTER — HOSPITAL ENCOUNTER (OUTPATIENT)
Dept: RADIOLOGY | Facility: HOSPITAL | Age: 64
Discharge: HOME | End: 2024-10-25
Attending: FAMILY MEDICINE
Payer: COMMERCIAL

## 2024-10-25 VITALS
HEIGHT: 65 IN | BODY MASS INDEX: 40.82 KG/M2 | WEIGHT: 245 LBS | SYSTOLIC BLOOD PRESSURE: 114 MMHG | DIASTOLIC BLOOD PRESSURE: 70 MMHG | TEMPERATURE: 98 F | OXYGEN SATURATION: 97 % | HEART RATE: 68 BPM

## 2024-10-25 DIAGNOSIS — M54.50 THORACOLUMBAR BACK PAIN: Primary | ICD-10-CM

## 2024-10-25 DIAGNOSIS — M54.6 THORACOLUMBAR BACK PAIN: ICD-10-CM

## 2024-10-25 DIAGNOSIS — M54.50 THORACOLUMBAR BACK PAIN: ICD-10-CM

## 2024-10-25 DIAGNOSIS — M54.6 THORACOLUMBAR BACK PAIN: Primary | ICD-10-CM

## 2024-10-25 PROBLEM — G56.02 CARPAL TUNNEL SYNDROME, LEFT: Status: ACTIVE | Noted: 2024-10-25

## 2024-10-25 PROBLEM — M17.12 PRIMARY OSTEOARTHRITIS OF LEFT KNEE: Status: ACTIVE | Noted: 2024-10-25

## 2024-10-25 PROBLEM — M17.11 PRIMARY OSTEOARTHRITIS OF RIGHT KNEE: Status: ACTIVE | Noted: 2024-10-25

## 2024-10-25 PROBLEM — Z86.0100 HISTORY OF COLONIC POLYPS: Status: ACTIVE | Noted: 2024-10-25

## 2024-10-25 PROCEDURE — 72070 X-RAY EXAM THORAC SPINE 2VWS: CPT | Mod: TC

## 2024-10-25 PROCEDURE — 72100 X-RAY EXAM L-S SPINE 2/3 VWS: CPT | Mod: TC

## 2024-10-25 RX ORDER — FUROSEMIDE 20 MG/1
20 TABLET ORAL DAILY PRN
COMMUNITY
Start: 2024-10-16

## 2024-10-25 NOTE — PROGRESS NOTES
Patient ID: [id number]     Chief Complaint: Back Pain (Mid back)        HPI:     Angelique Martin is a 64 y.o. female here today for Back Pain (Mid back). Thoracolumbar spin for the past 4 months.     -------------------------------------    Anxiety disorder, unspecified    Bronchial asthma    CAD (coronary artery disease)    Cervicalgia    Depression    Family history of ovarian cancer    sister    GERD (gastroesophageal reflux disease)    Hiatal hernia    History of pressure ulcer    Hypothyroidism, unspecified    Osteoarthritis of knee    Personal history of colonic polyps    s/p polypectomy - Dr Santa Howell, III    Postmenopausal state    Rheumatoid arthritis, unspecified    Thyroid disease    Vitamin D deficiency        Past Surgical History:   Procedure Laterality Date    ABLATION, FIBROID, UTERUS, LAPAROSCOPIC, WITH US GUIDANCE  2019    Dr Tyler Farrell    CHOLECYSTECTOMY  2010    COLONOSCOPY W/ BIOPSIES AND POLYPECTOMY  05/12/2020    Dr Santa Howell, III    COLONOSCOPY W/ BIOPSIES AND POLYPECTOMY  05/02/2023    Dr Santa Howell, III    DILATION AND CURETTAGE OF UTERUS  1981    Dr Tyler Farrell    ESOPHAGOGASTRODUODENOSCOPY  04/25/2019    Dr Santa Howell, III    ESOPHAGOGASTRODUODENOSCOPY  06/27/2023    Dr Santa Howell    HYSTERECTOMY, TOTAL, LAPAROSCOPIC, WITH SALPINGO-OOPHORECTOMY Bilateral 04/2022    Dr Tyler Farrell    SINUS SURGERY  2018    TONSILLECTOMY  1972    TOTAL KNEE ARTHROPLASTY Left 12/28/2021    Dr Abdelrahman Person    TUBAL LIGATION Bilateral 1988    Dr Tyler Farrell       Review of patient's allergies indicates:  No Known Allergies    Outpatient Medications Marked as Taking for the 10/25/24 encounter (Office Visit) with Tadeo Ambriz,    Medication Sig Dispense Refill    albuterol (PROVENTIL/VENTOLIN HFA) 90 mcg/actuation inhaler Inhale 1 puff into the lungs daily as needed.      alosetron (LOTRONEX) 1 mg tablet Take 0.5 mg by mouth once daily.      aspirin (ECOTRIN) 81 MG EC tablet  Take 1 tablet by mouth Daily.      calcium carbonate (CALCIUM 500 ORAL) Take 1 tablet by mouth once daily.      cyanocobalamin 1,000 mcg/mL injection 1 mL sc daily for 7 days, then weekly for 1 month, then monthly for 6 months 30 mL 1    donepeziL (ARICEPT) 5 MG tablet Take 1 tablet (5 mg total) by mouth every evening. 30 tablet 11    esomeprazole (NEXIUM) 40 MG capsule Take 1 capsule by mouth Daily.      estradioL (ESTRACE) 1 MG tablet Take 1 tablet by mouth Daily.      furosemide (LASIX) 20 MG tablet Take 20 mg by mouth daily as needed.      glucosamine-chondroitin 500-400 mg tablet Take 1 tablet by mouth 2 (two) times a day.      levothyroxine (SYNTHROID) 50 MCG tablet Take 1 tablet by mouth Daily.      multivitamin capsule Take 1 capsule by mouth once daily.      vitamin D (VITAMIN D3) 1000 units Tab Take 1,000 Units by mouth Daily.         Social History     Socioeconomic History    Marital status:    Tobacco Use    Smoking status: Never    Smokeless tobacco: Never   Substance and Sexual Activity    Alcohol use: Yes     Alcohol/week: 2.0 standard drinks of alcohol     Types: 1 Glasses of wine, 1 Cans of beer per week     Comment: Weekly    Drug use: Never    Sexual activity: Yes     Partners: Male     Birth control/protection: Post-menopausal, See Surgical Hx     Social Drivers of Health     Financial Resource Strain: Low Risk  (7/11/2023)    Overall Financial Resource Strain (CARDIA)     Difficulty of Paying Living Expenses: Not hard at all   Food Insecurity: No Food Insecurity (7/11/2023)    Hunger Vital Sign     Worried About Running Out of Food in the Last Year: Never true     Ran Out of Food in the Last Year: Never true   Transportation Needs: No Transportation Needs (7/11/2023)    PRAPARE - Transportation     Lack of Transportation (Medical): No     Lack of Transportation (Non-Medical): No   Physical Activity: Sufficiently Active (7/11/2023)    Exercise Vital Sign     Days of Exercise per Week: 5  "days     Minutes of Exercise per Session: 30 min   Stress: Stress Concern Present (7/11/2023)    [employer] - Occupational Stress Questionnaire     Feeling of Stress : To some extent   Housing Stability: Low Risk  (7/11/2023)    Housing Stability Vital Sign     Unable to Pay for Housing in the Last Year: No     Number of Places Lived in the Last Year: 1     Unstable Housing in the Last Year: No        Family History   Problem Relation Name Age of Onset    Heart disease Mother Luann     Hypertension Mother Luann     Arthritis Mother Luann     Paranoid behavior Father      Pneumonia Father      Ovarian cancer Sister Janna     Depression Sister Janna     Anxiety disorder Sister Janna     Arthritis Brother Semaj         Patient Care Team:  Tadeo Ambriz DO as PCP - General (Family Medicine)  Santa Howell III, MD as Consulting Physician (Gastroenterology)  Kendall Vuong MD as Consulting Physician (Cardiology)  DAVID Person MD (Orthopedic Surgery)  Tyler Farrell MD as Consulting Physician (Obstetrics and Gynecology)  Maureen Gaming NP (Pulmonary Disease)  Amber Wasserman MD as Consulting Physician (Neurology)     Subjective:     Review of Systems   Constitutional:  Negative for fever and weight loss.   Cardiovascular:  Negative for chest pain.   Gastrointestinal:  Negative for abdominal pain.   Genitourinary:  Negative for dysuria and hematuria.   Musculoskeletal:  Positive for back pain.   Neurological:  Negative for tingling, weakness and headaches.       See HPI for details  All Other ROS: Negative except as stated in HPI.       Objective:     /70   Pulse 68   Temp 97.6 °F (36.4 °C) (Temporal)   Ht 5' 5" (1.651 m)   Wt 111.1 kg (245 lb)   SpO2 97%   BMI 40.77 kg/m²     Physical Exam  Vitals reviewed.   Constitutional:       General: She is not in acute distress.     Appearance: Normal appearance.   Cardiovascular:      Rate and Rhythm: Normal " rate and regular rhythm.      Heart sounds: No murmur heard.     No friction rub. No gallop.   Pulmonary:      Effort: No respiratory distress.      Breath sounds: No wheezing, rhonchi or rales.   Musculoskeletal:         General: Tenderness present. No swelling or deformity.      Right lower leg: No edema.      Left lower leg: No edema.      Comments: Tenderness to palpation of thoracolumbar region. Pain worsened with rotation to left and right (geater right than left), extension, and side bending.    Skin:     General: Skin is warm and dry.      Findings: No lesion or rash.   Neurological:      General: No focal deficit present.      Mental Status: She is alert.   Psychiatric:         Mood and Affect: Mood normal.         Assessment/Plan:     1. Thoracolumbar back pain  -     X-Ray Thoracic Spine AP Lateral; Future; Expected date: 10/25/2024  -     X-Ray Lumbar Spine AP And Lateral; Future; Expected date: 10/25/2024  -     Comprehensive Metabolic Panel; Future; Expected date: 10/25/2024  -     Urinalysis, Reflex to Urine Culture; Future; Expected date: 10/25/2024      Recommended tylenol arthritis 650mg TID and heat therapy.   Follow up:     Follow up as needed. In addition to their scheduled follow up, the patient has also been instructed to follow up on as needed basis.     Answers submitted by the patient for this visit:  Back Pain Questionnaire (Submitted on 10/24/2024)  Chief Complaint: Back pain  Chronicity: chronic  Onset: more than 1 month ago  Frequency: daily  Progression since onset: waxing and waning  Pain location: thoracic spine  Pain quality: stabbing  Radiates to: does not radiate  Pain - numeric: 6/10  Pain is: worse during the night  Aggravated by: position, lying down, standing, twisting  Stiffness is present: in the morning, at night  bladder incontinence: Yes  bowel incontinence: No  leg pain: Yes  numbness: Yes  paresis: Yes  paresthesias: No  pelvic pain: No  perianal numbness: No  genital  pain: No  Pain severity: moderate  Improvement on treatment: no relief

## 2024-10-31 ENCOUNTER — TELEPHONE (OUTPATIENT)
Dept: FAMILY MEDICINE | Facility: CLINIC | Age: 64
End: 2024-10-31
Payer: COMMERCIAL

## 2024-11-01 DIAGNOSIS — E53.8 B12 DEFICIENCY: ICD-10-CM

## 2024-11-01 RX ORDER — CYANOCOBALAMIN 1000 UG/ML
INJECTION, SOLUTION INTRAMUSCULAR; SUBCUTANEOUS
Qty: 3 ML | Refills: 19 | Status: SHIPPED | OUTPATIENT
Start: 2024-11-01

## 2024-11-04 ENCOUNTER — TELEPHONE (OUTPATIENT)
Dept: FAMILY MEDICINE | Facility: CLINIC | Age: 64
End: 2024-11-04
Payer: COMMERCIAL

## 2024-11-04 DIAGNOSIS — M54.6 PAIN IN THORACIC SPINE: ICD-10-CM

## 2024-11-04 DIAGNOSIS — M54.9 DORSALGIA, UNSPECIFIED: Primary | ICD-10-CM

## 2024-11-04 NOTE — TELEPHONE ENCOUNTER
Patient given results. States she her symptoms have improved but not resolved. Would like to have MRI of spine or ct to rule out kidney stone

## 2024-11-04 NOTE — TELEPHONE ENCOUNTER
Attempted to  inform patient about ct being ordered no answer left detailed voicemail with explanation and instructed to return call with any questions

## 2024-11-04 NOTE — TELEPHONE ENCOUNTER
----- Message from Jason Trujillo NP sent at 11/2/2024  4:17 PM CDT -----  Please inform patient of lab results.     1. XR thoracic and lumbar spine shows Degenerative changes. Dr. POOLE recommended Tylenol and heat therapy from his note. Is she feeling better?     Thanks for all you do,   Jason

## 2024-11-14 ENCOUNTER — HOSPITAL ENCOUNTER (OUTPATIENT)
Dept: RADIOLOGY | Facility: HOSPITAL | Age: 64
Discharge: HOME | End: 2024-11-14
Payer: COMMERCIAL

## 2024-11-14 DIAGNOSIS — M54.9 DORSALGIA, UNSPECIFIED: ICD-10-CM

## 2024-11-14 DIAGNOSIS — M54.6 PAIN IN THORACIC SPINE: ICD-10-CM

## 2024-11-14 PROCEDURE — 72131 CT LUMBAR SPINE W/O DYE: CPT | Mod: TC

## 2024-11-14 PROCEDURE — 72128 CT CHEST SPINE W/O DYE: CPT | Mod: TC

## 2024-11-19 ENCOUNTER — TELEPHONE (OUTPATIENT)
Dept: FAMILY MEDICINE | Facility: CLINIC | Age: 64
End: 2024-11-19
Payer: COMMERCIAL

## 2024-11-19 NOTE — TELEPHONE ENCOUNTER
----- Message from Tadeo Ambriz DO sent at 11/19/2024  1:40 PM CST -----  I have reviewed the imaging results.     Ct Lumbar spine showed degenerative changes     CT thoracic noted a large hiatal hernia.    Additionally, T9 compression deformity with anterior wedge compression of T11, both appear to be chronic. L1 compression fracture but radiology is uncertain of age. Overall the spine looks osteoporotic so even relatively minor events could have caused these injuries.

## 2024-12-03 ENCOUNTER — TELEPHONE (OUTPATIENT)
Dept: NEUROLOGY | Facility: CLINIC | Age: 64
End: 2024-12-03
Payer: MEDICARE

## 2024-12-03 NOTE — TELEPHONE ENCOUNTER
States she stop taking Aricept. It made her feel drowsy during the day. She felt better without it

## 2025-05-22 DIAGNOSIS — Z00.00 WELLNESS EXAMINATION: Primary | ICD-10-CM

## 2025-05-22 DIAGNOSIS — Z11.4 SCREENING FOR HIV (HUMAN IMMUNODEFICIENCY VIRUS): ICD-10-CM

## 2025-06-03 ENCOUNTER — LAB VISIT (OUTPATIENT)
Dept: LAB | Facility: HOSPITAL | Age: 65
End: 2025-06-03
Attending: FAMILY MEDICINE
Payer: COMMERCIAL

## 2025-06-03 DIAGNOSIS — Z11.4 SCREENING FOR HIV (HUMAN IMMUNODEFICIENCY VIRUS): ICD-10-CM

## 2025-06-03 DIAGNOSIS — Z00.00 WELLNESS EXAMINATION: ICD-10-CM

## 2025-06-03 LAB
ALBUMIN SERPL-MCNC: 3.4 G/DL (ref 3.4–4.8)
ALBUMIN/GLOB SERPL: 1 RATIO (ref 1.1–2)
ALP SERPL-CCNC: 68 UNIT/L (ref 40–150)
ALT SERPL-CCNC: 13 UNIT/L (ref 0–55)
ANION GAP SERPL CALC-SCNC: 7 MEQ/L
AST SERPL-CCNC: 10 UNIT/L (ref 11–45)
BASOPHILS # BLD AUTO: 0.03 X10(3)/MCL
BASOPHILS NFR BLD AUTO: 0.6 %
BILIRUB SERPL-MCNC: 0.4 MG/DL
BUN SERPL-MCNC: 14 MG/DL (ref 9.8–20.1)
CALCIUM SERPL-MCNC: 8.8 MG/DL (ref 8.4–10.2)
CHLORIDE SERPL-SCNC: 111 MMOL/L (ref 98–107)
CHOLEST SERPL-MCNC: 191 MG/DL
CHOLEST/HDLC SERPL: 3 {RATIO} (ref 0–5)
CO2 SERPL-SCNC: 25 MMOL/L (ref 23–31)
CREAT SERPL-MCNC: 0.65 MG/DL (ref 0.55–1.02)
CREAT/UREA NIT SERPL: 22
EOSINOPHIL # BLD AUTO: 0.1 X10(3)/MCL (ref 0–0.9)
EOSINOPHIL NFR BLD AUTO: 1.9 %
ERYTHROCYTE [DISTWIDTH] IN BLOOD BY AUTOMATED COUNT: 15.3 % (ref 11.5–17)
GFR SERPLBLD CREATININE-BSD FMLA CKD-EPI: >60 ML/MIN/1.73/M2
GLOBULIN SER-MCNC: 3.5 GM/DL (ref 2.4–3.5)
GLUCOSE SERPL-MCNC: 92 MG/DL (ref 82–115)
HCT VFR BLD AUTO: 39.5 % (ref 37–47)
HCV AB SERPL QL IA: NONREACTIVE
HDLC SERPL-MCNC: 59 MG/DL (ref 35–60)
HGB BLD-MCNC: 12.4 G/DL (ref 12–16)
HIV 1+2 AB+HIV1 P24 AG SERPL QL IA: NONREACTIVE
IMM GRANULOCYTES # BLD AUTO: 0.01 X10(3)/MCL (ref 0–0.04)
IMM GRANULOCYTES NFR BLD AUTO: 0.2 %
LDLC SERPL CALC-MCNC: 113 MG/DL (ref 50–140)
LYMPHOCYTES # BLD AUTO: 1.74 X10(3)/MCL (ref 0.6–4.6)
LYMPHOCYTES NFR BLD AUTO: 32.8 %
MCH RBC QN AUTO: 27.9 PG (ref 27–31)
MCHC RBC AUTO-ENTMCNC: 31.4 G/DL (ref 33–36)
MCV RBC AUTO: 89 FL (ref 80–94)
MONOCYTES # BLD AUTO: 0.63 X10(3)/MCL (ref 0.1–1.3)
MONOCYTES NFR BLD AUTO: 11.9 %
NEUTROPHILS # BLD AUTO: 2.8 X10(3)/MCL (ref 2.1–9.2)
NEUTROPHILS NFR BLD AUTO: 52.6 %
NRBC BLD AUTO-RTO: 0 %
PLATELET # BLD AUTO: 282 X10(3)/MCL (ref 130–400)
PMV BLD AUTO: 8.8 FL (ref 7.4–10.4)
POTASSIUM SERPL-SCNC: 3.9 MMOL/L (ref 3.5–5.1)
PROT SERPL-MCNC: 6.9 GM/DL (ref 5.8–7.6)
RBC # BLD AUTO: 4.44 X10(6)/MCL (ref 4.2–5.4)
SODIUM SERPL-SCNC: 143 MMOL/L (ref 136–145)
TRIGL SERPL-MCNC: 94 MG/DL (ref 37–140)
TSH SERPL-ACNC: 1.8 UIU/ML (ref 0.35–4.94)
VLDLC SERPL CALC-MCNC: 19 MG/DL
WBC # BLD AUTO: 5.31 X10(3)/MCL (ref 4.5–11.5)

## 2025-06-03 PROCEDURE — 80061 LIPID PANEL: CPT

## 2025-06-03 PROCEDURE — 80053 COMPREHEN METABOLIC PANEL: CPT

## 2025-06-03 PROCEDURE — 85025 COMPLETE CBC W/AUTO DIFF WBC: CPT

## 2025-06-03 PROCEDURE — 86803 HEPATITIS C AB TEST: CPT

## 2025-06-03 PROCEDURE — 84443 ASSAY THYROID STIM HORMONE: CPT

## 2025-06-03 PROCEDURE — 36415 COLL VENOUS BLD VENIPUNCTURE: CPT

## 2025-06-03 PROCEDURE — 87389 HIV-1 AG W/HIV-1&-2 AB AG IA: CPT

## 2025-06-06 ENCOUNTER — OFFICE VISIT (OUTPATIENT)
Dept: FAMILY MEDICINE | Facility: CLINIC | Age: 65
End: 2025-06-06
Payer: COMMERCIAL

## 2025-06-06 VITALS
SYSTOLIC BLOOD PRESSURE: 117 MMHG | WEIGHT: 258 LBS | TEMPERATURE: 97 F | BODY MASS INDEX: 42.99 KG/M2 | OXYGEN SATURATION: 95 % | HEART RATE: 81 BPM | HEIGHT: 65 IN | DIASTOLIC BLOOD PRESSURE: 74 MMHG | RESPIRATION RATE: 20 BRPM

## 2025-06-06 DIAGNOSIS — Z79.85 LONG-TERM CURRENT USE OF INJECTABLE NONINSULIN ANTIDIABETIC MEDICATION: ICD-10-CM

## 2025-06-06 DIAGNOSIS — E66.01 CLASS 3 SEVERE OBESITY DUE TO EXCESS CALORIES WITH SERIOUS COMORBIDITY AND BODY MASS INDEX (BMI) OF 40.0 TO 44.9 IN ADULT: ICD-10-CM

## 2025-06-06 DIAGNOSIS — E66.813 CLASS 3 SEVERE OBESITY DUE TO EXCESS CALORIES WITH SERIOUS COMORBIDITY AND BODY MASS INDEX (BMI) OF 40.0 TO 44.9 IN ADULT: ICD-10-CM

## 2025-06-06 DIAGNOSIS — K44.9 HIATAL HERNIA: ICD-10-CM

## 2025-06-06 DIAGNOSIS — Z00.00 MEDICARE ANNUAL WELLNESS VISIT, SUBSEQUENT: Primary | ICD-10-CM

## 2025-06-06 DIAGNOSIS — E03.9 HYPOTHYROIDISM, UNSPECIFIED TYPE: ICD-10-CM

## 2025-06-06 DIAGNOSIS — E04.1 THYROID NODULE: ICD-10-CM

## 2025-06-06 RX ORDER — TIRZEPATIDE 2.5 MG/.5ML
2.5 INJECTION, SOLUTION SUBCUTANEOUS
Qty: 2 ML | Refills: 0 | Status: SHIPPED | OUTPATIENT
Start: 2025-06-06 | End: 2025-06-06

## 2025-06-06 RX ORDER — LORATADINE 10 MG/1
10 TABLET ORAL DAILY PRN
COMMUNITY
Start: 2025-04-22

## 2025-06-10 ENCOUNTER — HOSPITAL ENCOUNTER (OUTPATIENT)
Dept: RADIOLOGY | Facility: HOSPITAL | Age: 65
Discharge: HOME | End: 2025-06-10
Payer: COMMERCIAL

## 2025-06-10 DIAGNOSIS — E04.1 THYROID NODULE: ICD-10-CM

## 2025-06-10 DIAGNOSIS — E66.813 CLASS 3 SEVERE OBESITY DUE TO EXCESS CALORIES WITH SERIOUS COMORBIDITY AND BODY MASS INDEX (BMI) OF 40.0 TO 44.9 IN ADULT: ICD-10-CM

## 2025-06-10 DIAGNOSIS — Z79.85 LONG-TERM CURRENT USE OF INJECTABLE NONINSULIN ANTIDIABETIC MEDICATION: ICD-10-CM

## 2025-06-10 DIAGNOSIS — E66.01 CLASS 3 SEVERE OBESITY DUE TO EXCESS CALORIES WITH SERIOUS COMORBIDITY AND BODY MASS INDEX (BMI) OF 40.0 TO 44.9 IN ADULT: ICD-10-CM

## 2025-06-10 PROCEDURE — 76536 US EXAM OF HEAD AND NECK: CPT | Mod: TC

## 2025-06-11 ENCOUNTER — RESULTS FOLLOW-UP (OUTPATIENT)
Dept: FAMILY MEDICINE | Facility: CLINIC | Age: 65
End: 2025-06-11

## 2025-06-11 ENCOUNTER — E-CONSULT (OUTPATIENT)
Dept: ENDOCRINOLOGY | Facility: CLINIC | Age: 65
End: 2025-06-11
Payer: COMMERCIAL

## 2025-06-11 ENCOUNTER — TELEPHONE (OUTPATIENT)
Dept: FAMILY MEDICINE | Facility: CLINIC | Age: 65
End: 2025-06-11
Payer: COMMERCIAL

## 2025-06-11 DIAGNOSIS — E04.1 THYROID NODULE: Primary | ICD-10-CM

## 2025-06-11 NOTE — TELEPHONE ENCOUNTER
----- Message from Jason Trujillo NP sent at 6/11/2025  9:33 AM CDT -----  Please inform patient of lab results.     Left thyroid nodule is seen again, but it is now consistent with TI-RADS 4 (considered moderately suspicious for malignancy), but is less than 1 cm in size.     Will E consult Endocrinology to evaluate if she needs in person follow up with Endo for further recommendations.     Thanks for all you do,   Jason   ----- Message -----  From: Interface, Rad Results In  Sent: 6/10/2025   4:32 PM CDT  To: Jason Trujillo NP

## 2025-06-11 NOTE — CONSULTS
[facility]  Response for E-Consult     Patient Name: Angelique Martin  MRN: [id number]  Primary Care Provider: Tadeo Amrbiz DO   Requesting Provider: Jason Trujillo NP  E-Consult to Endocrinology  Consult performed by: Navdeep Alonzo DO  Consult ordered by: Jason Trujillo NP  Reason for consult: Thyroid nodule  Assessment/Recommendations: See note      I have reviewed the chart, question, and imaging for this 64-year-old female patient with a left thyroid nodule classified as TI-RADS 4.    The ultrasound showed a solid, hypoechoic nodule in the left thyroid measuring 7.8 x 5.5 x 6.5 mm, which has been stable compared to prior imaging. While this meets TI-RADS 4 criteria (intermediate suspicion), the nodule is less than 1 cm, and therefore does not meet current ACR TI-RADS or CHANTAL size criteria for fine-needle aspiration (FNA).    Recommendations:  No in-person Endocrinology visit or biopsy is needed at this time.  Recommend repeat thyroid ultrasound in 1 year for monitoring.  If the nodule remains stable again at that time, imaging can typically be spaced to every 2-3 years.    Further evaluation would be warranted if the patient develops new symptoms (e.g., rapid growth, hoarseness, dysphagia) or if the nodule shows significant interval growth.    Total time of Consultation: 5 minute    I did not speak to the requesting provider verbally about this.     *This eConsult is based on the clinical data available to me and is furnished without benefit of a physical examination. The eConsult will need to be interpreted in light of any clinical issues or changes in patient status not available to me at the time of filing this eConsults. Significant changes in patient condition or level of acuity should result in immediate formal consultation and reevaluation. Please alert me if you have further questions.    Thank you for this eConsult referral.     DO [facility]  [facility]

## 2025-06-12 ENCOUNTER — TELEPHONE (OUTPATIENT)
Dept: FAMILY MEDICINE | Facility: CLINIC | Age: 65
End: 2025-06-12
Payer: COMMERCIAL

## 2025-06-24 ENCOUNTER — PATIENT OUTREACH (OUTPATIENT)
Dept: ADMINISTRATIVE | Facility: HOSPITAL | Age: 65
End: 2025-06-24
Payer: MEDICARE

## 2025-07-07 ENCOUNTER — OFFICE VISIT (OUTPATIENT)
Dept: FAMILY MEDICINE | Facility: CLINIC | Age: 65
End: 2025-07-07
Payer: COMMERCIAL

## 2025-07-07 VITALS
HEIGHT: 65 IN | RESPIRATION RATE: 18 BRPM | OXYGEN SATURATION: 97 % | DIASTOLIC BLOOD PRESSURE: 72 MMHG | SYSTOLIC BLOOD PRESSURE: 120 MMHG | TEMPERATURE: 97 F | BODY MASS INDEX: 41.18 KG/M2 | WEIGHT: 247.19 LBS | HEART RATE: 65 BPM

## 2025-07-07 DIAGNOSIS — E66.813 CLASS 3 SEVERE OBESITY DUE TO EXCESS CALORIES WITH SERIOUS COMORBIDITY AND BODY MASS INDEX (BMI) OF 40.0 TO 44.9 IN ADULT: Primary | ICD-10-CM

## 2025-07-07 DIAGNOSIS — E66.01 CLASS 3 SEVERE OBESITY DUE TO EXCESS CALORIES WITH SERIOUS COMORBIDITY AND BODY MASS INDEX (BMI) OF 40.0 TO 44.9 IN ADULT: Primary | ICD-10-CM

## 2025-07-07 DIAGNOSIS — R06.83 SNORING: ICD-10-CM

## 2025-07-07 PROBLEM — Z00.00 MEDICARE ANNUAL WELLNESS VISIT, SUBSEQUENT: Status: RESOLVED | Noted: 2025-06-06 | Resolved: 2025-07-07

## 2025-07-07 PROCEDURE — 3078F DIAST BP <80 MM HG: CPT | Mod: CPTII,,,

## 2025-07-07 PROCEDURE — 99213 OFFICE O/P EST LOW 20 MIN: CPT | Mod: ,,,

## 2025-07-07 PROCEDURE — 1159F MED LIST DOCD IN RCRD: CPT | Mod: CPTII,,,

## 2025-07-07 PROCEDURE — G2211 COMPLEX E/M VISIT ADD ON: HCPCS | Mod: ,,,

## 2025-07-07 PROCEDURE — 3008F BODY MASS INDEX DOCD: CPT | Mod: CPTII,,,

## 2025-07-07 PROCEDURE — 1160F RVW MEDS BY RX/DR IN RCRD: CPT | Mod: CPTII,,,

## 2025-07-07 PROCEDURE — 3074F SYST BP LT 130 MM HG: CPT | Mod: CPTII,,,

## 2025-07-07 NOTE — ASSESSMENT & PLAN NOTE
Explained FDA indication of Zepbound for sleep apnea.  Ordered sleep study to confirm diagnosis before treatment can be covered by insurance.  Advised patient to contact insurance provider regarding coverage details, as coverage for medication under sleep apnea diagnosis is not guaranteed.

## 2025-07-07 NOTE — ASSESSMENT & PLAN NOTE
Angelique admits to likely inadequate water consumption despite efforts.  Recommend increasing water intake to ensure proper hydration.    Increased GLP-1 medication (Zepbound) to 5 mg with 30-day supply and 2 refills.  Ordered lab work to check renal function prior to next appointment.

## 2025-07-07 NOTE — PROGRESS NOTES
Patient ID: [id number]     Chief Complaint: Follow-up (1 mnt f/o for weight)    HPI:     Angelique presents today for follow-up after starting GLP-1 medication (tirzepatide 2.5 mg weekly).    GLP-1 MEDICATION:  She reports good tolerance to GLP-1 medication.  She feels well overall.    Tolerance to current therapy:  Denies nausea, vomiting, diarrhea, constipation, abdominal pain, depression or thoughts of suicide, dizziness, rapid pulse, fatigue    SNORING:  She reports concerns about potential sleep apnea with snoring, and her  has consistently recommended she undergo a sleep study. She has been hesitant to pursue evaluation in the past but is now interested in pursuing diagnostic testing and is motivated to complete a sleep study.    EXERCISE:  She reports working in her yard as her primary form of exercise.    HYDRATION:  She reports attempting to increase water intake but acknowledges likely insufficient hydration and recognizes current intake may be inadequate.     Co-morbidities: Hyperlipidemia and Hypothyroidism    Contraindications: Denies personal or family history of medullary thyroid cancer or Multiple Endocrine Neoplasia type 2 (MEN 2), pregnancy, and severe gastrointestinal problems.      Past Medical History:   Diagnosis Date    Anxiety disorder, unspecified     Bronchial asthma     CAD (coronary artery disease)     Cervicalgia     Depression     Family history of ovarian cancer     sister    GERD (gastroesophageal reflux disease)     Hiatal hernia     History of pressure ulcer     Hypothyroidism, unspecified     Osteoarthritis of knee     Personal history of colonic polyps 05/02/2023    s/p polypectomy - Dr Santa Howell, III    Postmenopausal state     Rheumatoid arthritis, unspecified     Thyroid disease     Vitamin D deficiency         Past Surgical History:   Procedure Laterality Date    ABLATION, FIBROID, UTERUS, LAPAROSCOPIC, WITH US GUIDANCE  2019    Dr Tyler Farrell    CHOLECYSTECTOMY   2010    COLONOSCOPY W/ BIOPSIES AND POLYPECTOMY  05/12/2020    Dr Santa Howell, III    COLONOSCOPY W/ BIOPSIES AND POLYPECTOMY  05/02/2023    Dr Santa Howell, III    DILATION AND CURETTAGE OF UTERUS  1981    Dr Tyler Farrell    ESOPHAGOGASTRODUODENOSCOPY  04/25/2019    Dr Santa Howell, III    ESOPHAGOGASTRODUODENOSCOPY  06/27/2023    Dr Santa Howell    HYSTERECTOMY, TOTAL, LAPAROSCOPIC, WITH SALPINGO-OOPHORECTOMY Bilateral 04/2022    Dr Tyler Farrell    SINUS SURGERY  2018    TONSILLECTOMY  1972    TOTAL KNEE ARTHROPLASTY Left 12/28/2021    Dr Abdelrahman Person    TUBAL LIGATION Bilateral 1988    Dr Tyler Farrell        Social History     Socioeconomic History    Marital status:    Tobacco Use    Smoking status: Never    Smokeless tobacco: Never   Substance and Sexual Activity    Alcohol use: Yes     Alcohol/week: 2.0 standard drinks of alcohol     Types: 1 Glasses of wine, 1 Cans of beer per week     Comment: Weekly    Drug use: Never    Sexual activity: Yes     Partners: Male     Birth control/protection: Post-menopausal, See Surgical Hx     Social Drivers of Health     Financial Resource Strain: Low Risk  (7/11/2023)    Overall Financial Resource Strain (CARDIA)     Difficulty of Paying Living Expenses: Not hard at all   Food Insecurity: No Food Insecurity (7/11/2023)    Hunger Vital Sign     Worried About Running Out of Food in the Last Year: Never true     Ran Out of Food in the Last Year: Never true   Transportation Needs: No Transportation Needs (7/11/2023)    PRAPARE - Transportation     Lack of Transportation (Medical): No     Lack of Transportation (Non-Medical): No   Physical Activity: Sufficiently Active (7/11/2023)    Exercise Vital Sign     Days of Exercise per Week: 5 days     Minutes of Exercise per Session: 30 min   Stress: Stress Concern Present (7/11/2023)    [employer] - Occupational Stress Questionnaire     Feeling of Stress : To some extent   Housing  "Stability: Low Risk  (7/11/2023)    Housing Stability Vital Sign     Unable to Pay for Housing in the Last Year: No     Number of Places Lived in the Last Year: 1     Unstable Housing in the Last Year: No        Current Outpatient Medications   Medication Instructions    albuterol (PROVENTIL/VENTOLIN HFA) 90 mcg/actuation inhaler 1 puff, Daily PRN    alosetron (LOTRONEX) 0.5 mg, Daily    aspirin (ECOTRIN) 81 MG EC tablet 1 tablet, Daily    calcium carbonate (CALCIUM 500 ORAL) 1 tablet, Daily    cyanocobalamin 1,000 mcg/mL injection INJECT 1ML ONCE MONTHLY    esomeprazole (NEXIUM) 40 MG capsule 1 capsule, Daily    estradioL (ESTRACE) 1 MG tablet 1 tablet, Daily    furosemide (LASIX) 20 mg, Daily PRN    glucosamine-chondroitin 500-400 mg tablet 1 tablet, 2 times daily    levothyroxine (SYNTHROID) 50 MCG tablet 1 tablet, Daily    loratadine (CLARITIN) 10 mg, Daily PRN    multivitamin capsule 1 capsule, Daily    tirzepatide (weight loss) 5 mg, Subcutaneous, Every 7 days    vitamin D (VITAMIN D3) 1,000 Units, Daily       Review of patient's allergies indicates:  No Known Allergies     Patient Care Team:  Tadeo Ambriz DO as PCP - General (Family Medicine)  Santa Howell III, MD as Consulting Physician (Gastroenterology)  Kendall Vuong MD as Consulting Physician (Cardiology)  DAVID Person MD (Orthopedic Surgery)  Tyler Farrell MD as Consulting Physician (Obstetrics and Gynecology)  Maureen Gaming NP (Pulmonary Disease)  Amber Wasserman MD as Consulting Physician (Neurology)     Subjective:     Review of Systems    12 point review of systems conducted, negative except as stated in the history of present illness. See HPI for details.    Objective:     Visit Vitals  /72 (BP Location: Right arm, Patient Position: Sitting)   Pulse 65   Temp 97.4 °F (36.3 °C) (Oral)   Resp 18   Ht 5' 5" (1.651 m)   Wt 112.1 kg (247 lb 3.2 oz)   SpO2 97%   BMI 41.14 kg/m²     Physical Exam  Vitals reviewed. "   Constitutional:       General: She is not in acute distress.     Appearance: Normal appearance.   Musculoskeletal:         General: No swelling, tenderness or deformity.      Right lower leg: No edema.      Left lower leg: No edema.   Skin:     General: Skin is warm and dry.      Findings: No lesion or rash.   Neurological:      General: No focal deficit present.      Mental Status: She is alert.   Psychiatric:         Mood and Affect: Mood normal.       Labs Reviewed:     Chemistry:  Lab Results   Component Value Date     06/03/2025    K 3.9 06/03/2025    BUN 14.0 06/03/2025    CREATININE 0.65 06/03/2025    EGFRNORACEVR >60 06/03/2025    CALCIUM 8.8 06/03/2025    ALKPHOS 68 06/03/2025    ALBUMIN 3.4 06/03/2025    BILIDIR 0.1 11/17/2021    IBILI 0.20 11/17/2021    AST 10 (L) 06/03/2025    ALT 13 06/03/2025    QALBKDXY74LT 50 06/03/2024    TSH 1.805 06/03/2025        Lab Results   Component Value Date    HGBA1C 5.4 11/17/2023        Hematology:  Lab Results   Component Value Date    WBC 5.31 06/03/2025    HGB 12.4 06/03/2025    HCT 39.5 06/03/2025     06/03/2025       Lipid Panel:  Lab Results   Component Value Date    CHOL 191 06/03/2025    HDL 59 06/03/2025    .00 06/03/2025    TRIG 94 06/03/2025    TOTALCHOLEST 3 06/03/2025        Urine:  Lab Results   Component Value Date    APPEARANCEUA Clear 10/25/2024    SGUA 1.025 10/25/2024    PROTEINUA Negative 10/25/2024    KETONESUA Negative 10/25/2024    LEUKOCYTESUR Negative 10/25/2024    RBCUA <5 06/21/2023    WBCUA <5 06/21/2023    BACTERIA None Seen 06/21/2023     Assessment:       ICD-10-CM ICD-9-CM   1. Class 3 severe obesity due to excess calories with serious comorbidity and body mass index (BMI) of 40.0 to 44.9 in adult  E66.813 278.01    E66.01 V85.41    Z68.41    2. Snoring  R06.83 786.09     Plan:     1. Class 3 severe obesity due to excess calories with serious comorbidity and body mass index (BMI) of 40.0 to 44.9 in  adult  Overview:  Starting weight: 258 pounds   Current weight: 112.1 kg (247 lb 3.2 oz)  % weight loss since GLP-1 initiation: 4.264 %    Assessment & Plan:  Angelique admits to likely inadequate water consumption despite efforts.  Recommend increasing water intake to ensure proper hydration.    Increased GLP-1 medication (Zepbound) to 5 mg with 30-day supply and 2 refills.  Ordered lab work to check renal function prior to next appointment.        Orders:  -     Ambulatory referral/consult to Sleep Disorders; Future; Expected date: 07/07/2025  -     tirzepatide, weight loss, 5 mg/0.5 mL Soln; Inject 0.5 mLs (5 mg total) into the skin every 7 days.  Dispense: 2 mL; Refill: 2  -     Comprehensive Metabolic Panel; Future; Expected date: 10/07/2025    2. Snoring  Assessment & Plan:  Explained FDA indication of Zepbound for sleep apnea.  Ordered sleep study to confirm diagnosis before treatment can be covered by insurance.  Advised patient to contact insurance provider regarding coverage details, as coverage for medication under sleep apnea diagnosis is not guaranteed.    Orders:  -     Ambulatory referral/consult to Sleep Disorders; Future; Expected date: 07/07/2025      Follow up in about 3 months (around 10/7/2025) for Obesity Follow Up. In addition to their scheduled follow up, the patient has also been instructed to follow up on as needed basis.     This note was generated with the assistance of ambient listening technology. Verbal consent was obtained by the patient and accompanying visitor(s) for the recording of patient appointment to facilitate this note. I attest to having reviewed and edited the generated note for accuracy, though some syntax or spelling errors may persist. Please contact the author of this note for any clarification.      Jason Trujillo, Adult-Gerontology NP

## 2025-07-16 ENCOUNTER — PATIENT MESSAGE (OUTPATIENT)
Dept: NEUROLOGY | Facility: CLINIC | Age: 65
End: 2025-07-16
Payer: MEDICARE

## 2025-07-24 ENCOUNTER — HOSPITAL ENCOUNTER (OUTPATIENT)
Dept: RADIOLOGY | Facility: HOSPITAL | Age: 65
Discharge: HOME | End: 2025-07-24
Attending: FAMILY MEDICINE
Payer: MEDICARE

## 2025-07-24 ENCOUNTER — OFFICE VISIT (OUTPATIENT)
Dept: FAMILY MEDICINE | Facility: CLINIC | Age: 65
End: 2025-07-24
Payer: COMMERCIAL

## 2025-07-24 VITALS
SYSTOLIC BLOOD PRESSURE: 123 MMHG | BODY MASS INDEX: 40.96 KG/M2 | WEIGHT: 245.81 LBS | HEART RATE: 74 BPM | RESPIRATION RATE: 20 BRPM | TEMPERATURE: 97 F | OXYGEN SATURATION: 98 % | HEIGHT: 65 IN | DIASTOLIC BLOOD PRESSURE: 75 MMHG

## 2025-07-24 DIAGNOSIS — R10.32 LEFT LOWER QUADRANT PAIN: ICD-10-CM

## 2025-07-24 DIAGNOSIS — R19.7 BLOODY DIARRHEA: Primary | ICD-10-CM

## 2025-07-24 DIAGNOSIS — R10.84 GENERALIZED ABDOMINAL PAIN: ICD-10-CM

## 2025-07-24 PROCEDURE — 3074F SYST BP LT 130 MM HG: CPT | Mod: CPTII,,, | Performed by: FAMILY MEDICINE

## 2025-07-24 PROCEDURE — 3078F DIAST BP <80 MM HG: CPT | Mod: CPTII,,, | Performed by: FAMILY MEDICINE

## 2025-07-24 PROCEDURE — 1160F RVW MEDS BY RX/DR IN RCRD: CPT | Mod: CPTII,,, | Performed by: FAMILY MEDICINE

## 2025-07-24 PROCEDURE — 1159F MED LIST DOCD IN RCRD: CPT | Mod: CPTII,,, | Performed by: FAMILY MEDICINE

## 2025-07-24 PROCEDURE — 74176 CT ABD & PELVIS W/O CONTRAST: CPT | Mod: TC

## 2025-07-24 PROCEDURE — 3008F BODY MASS INDEX DOCD: CPT | Mod: CPTII,,, | Performed by: FAMILY MEDICINE

## 2025-07-24 PROCEDURE — 99214 OFFICE O/P EST MOD 30 MIN: CPT | Mod: ,,, | Performed by: FAMILY MEDICINE

## 2025-07-24 NOTE — PROGRESS NOTES
"   Patient ID: [id number]     Chief Complaint: Abdominal Pain (" I was constipated then it turned into diarrhea. Having  bloody diarrhea now. "Started yesterday. Also vomited last night")    HPI:     Angelique Martin is a 64 y.o. female here today for Abdominal Pain (" I was constipated then it turned into diarrhea. Having  bloody diarrhea now. "Started yesterday. Also vomited last night").      History of Present Illness               -------------------------------------    Anxiety disorder, unspecified    Bronchial asthma    CAD (coronary artery disease)    Cervicalgia    Depression    Family history of ovarian cancer    sister    GERD (gastroesophageal reflux disease)    Hiatal hernia    History of pressure ulcer    Hypothyroidism, unspecified    Osteoarthritis of knee    Personal history of colonic polyps    s/p polypectomy - Dr Santa Howell, III    Postmenopausal state    Rheumatoid arthritis, unspecified    Thyroid disease    Vitamin D deficiency        Past Surgical History:   Procedure Laterality Date    ABLATION, FIBROID, UTERUS, LAPAROSCOPIC, WITH US GUIDANCE  2019    Dr Tyler Farrell    CHOLECYSTECTOMY  2010    COLONOSCOPY W/ BIOPSIES AND POLYPECTOMY  05/12/2020    Dr Santa Howell, III    COLONOSCOPY W/ BIOPSIES AND POLYPECTOMY  05/02/2023    Dr Santa Howell, III    DILATION AND CURETTAGE OF UTERUS  1981    Dr Tyler Farrell    ESOPHAGOGASTRODUODENOSCOPY  04/25/2019    Dr Santa Howell, III    ESOPHAGOGASTRODUODENOSCOPY  06/27/2023    Dr Santa Howell    HYSTERECTOMY      HYSTERECTOMY, TOTAL, LAPAROSCOPIC, WITH SALPINGO-OOPHORECTOMY Bilateral 04/2022    Dr Tyler Farrell    JOINT REPLACEMENT      SINUS SURGERY  2018    TONSILLECTOMY  1972    TOTAL KNEE ARTHROPLASTY Left 12/28/2021    Dr Abdelrahman Person    TUBAL LIGATION Bilateral 1988    Dr Tyler Farrell       Review of patient's allergies indicates:  No Known Allergies    Outpatient Medications Marked as Taking for the 7/24/25 encounter (Office Visit) " with Tadeo Ambriz DO   Medication Sig Dispense Refill    albuterol (PROVENTIL/VENTOLIN HFA) 90 mcg/actuation inhaler Inhale 1 puff into the lungs daily as needed.      alosetron (LOTRONEX) 1 mg tablet Take 0.5 mg by mouth once daily.      aspirin (ECOTRIN) 81 MG EC tablet Take 1 tablet by mouth Daily.      calcium carbonate (CALCIUM 500 ORAL) Take 1 tablet by mouth once daily.      cyanocobalamin 1,000 mcg/mL injection INJECT 1ML ONCE MONTHLY 3 mL 19    esomeprazole (NEXIUM) 40 MG capsule Take 1 capsule by mouth Daily.      estradioL (ESTRACE) 1 MG tablet Take 1 tablet by mouth Daily.      furosemide (LASIX) 20 MG tablet Take 20 mg by mouth daily as needed.      glucosamine-chondroitin 500-400 mg tablet Take 1 tablet by mouth 2 (two) times a day.      levothyroxine (SYNTHROID) 50 MCG tablet Take 1 tablet by mouth Daily.      multivitamin capsule Take 1 capsule by mouth once daily.      tirzepatide, weight loss, 5 mg/0.5 mL Soln Inject 0.5 mLs (5 mg total) into the skin every 7 days. 2 mL 2    vitamin D (VITAMIN D3) 1000 units Tab Take 1,000 Units by mouth Daily.         Social History[1]     Family History   Problem Relation Name Age of Onset    Heart disease Mother Luann     Hypertension Mother Luann     Arthritis Mother Luann     Paranoid behavior Father      Pneumonia Father      Ovarian cancer Sister Janna     Depression Sister Janna     Anxiety disorder Sister Janna     Arthritis Brother Semaj         Patient Care Team:  Tadeo Ambriz DO as PCP - General (Family Medicine)  Santa Howell III, MD as Consulting Physician (Gastroenterology)  Kendall Vuong MD as Consulting Physician (Cardiology)  DAVID Person MD (Orthopedic Surgery)  Tyler Farrell MD as Consulting Physician (Obstetrics and Gynecology)  Maureen Gaming NP (Pulmonary Disease)  Amber Wasserman MD as Consulting Physician (Neurology)     Subjective:     ROS    See HPI for details  All Other ROS: Negative except as  "stated in HPI.       Objective:     /75 (BP Location: Left arm, Patient Position: Sitting)   Pulse 74   Temp 97.4 °F (36.3 °C)   Resp 20   Ht 5' 5" (1.651 m)   Wt 111.5 kg (245 lb 12.8 oz)   SpO2 98%   BMI 40.90 kg/m²     Physical Exam  Vitals reviewed.   Constitutional:       General: She is not in acute distress.     Appearance: Normal appearance.   Cardiovascular:      Rate and Rhythm: Normal rate and regular rhythm.      Heart sounds: No murmur heard.     No friction rub. No gallop.   Pulmonary:      Effort: No respiratory distress.      Breath sounds: No wheezing, rhonchi or rales.   Musculoskeletal:         General: No swelling, tenderness or deformity.      Right lower leg: No edema.      Left lower leg: No edema.   Skin:     General: Skin is warm and dry.      Findings: No lesion or rash.   Neurological:      General: No focal deficit present.      Mental Status: She is alert.   Psychiatric:         Mood and Affect: Mood normal.         Assessment/Plan:     1. Bloody diarrhea  -     GI Pathogen PanelCR, F; Future; Expected date: 07/24/2025    2. Generalized abdominal pain  -     GI Pathogen PanelCR, F; Future; Expected date: 07/24/2025    3. Left lower quadrant pain  -     CT Abdomen Pelvis  Without Contrast; Future; Expected date: 07/24/2025  -     GI Pathogen PanelCR, F; Future; Expected date: 07/24/2025      Suspect diverticulitis but infectious diarrhea not rule out. Low residue diet recommended. Antibiotics to be prescribed as indicated.     Assessment & Plan               This note was generated with the assistance of ambient listening technology. Verbal consent was obtained by the patient and accompanying visitor(s) for the recording of patient appointment to facilitate this note. I attest to having reviewed and edited the generated note for accuracy, though some syntax or spelling errors may persist. Please contact the author of this note for any clarification.     Follow up:     Follow " up if symptoms worsen or fail to improve. In addition to their scheduled follow up, the patient has also been instructed to follow up on as needed basis.          [1]   Social History  Socioeconomic History    Marital status:    Tobacco Use    Smoking status: Never    Smokeless tobacco: Never   Substance and Sexual Activity    Alcohol use: Yes     Alcohol/week: 2.0 standard drinks of alcohol     Types: 1 Glasses of wine, 1 Cans of beer per week     Comment: Weekly    Drug use: Never    Sexual activity: Yes     Partners: Male     Birth control/protection: Post-menopausal, See Surgical Hx     Social Drivers of Health     Financial Resource Strain: Low Risk  (7/24/2025)    Overall Financial Resource Strain (CARDIA)     Difficulty of Paying Living Expenses: Not hard at all   Food Insecurity: No Food Insecurity (7/24/2025)    Hunger Vital Sign     Worried About Running Out of Food in the Last Year: Never true     Ran Out of Food in the Last Year: Never true   Transportation Needs: No Transportation Needs (7/24/2025)    PRAPARE - Transportation     Lack of Transportation (Medical): No     Lack of Transportation (Non-Medical): No   Physical Activity: Insufficiently Active (7/24/2025)    Exercise Vital Sign     Days of Exercise per Week: 1 day     Minutes of Exercise per Session: 10 min   Stress: No Stress Concern Present (7/24/2025)    [employer] - Occupational Stress Questionnaire     Feeling of Stress : Only a little   Housing Stability: Low Risk  (7/24/2025)    Housing Stability Vital Sign     Unable to Pay for Housing in the Last Year: No     Homeless in the Last Year: No

## 2025-07-25 ENCOUNTER — LAB VISIT (OUTPATIENT)
Dept: LAB | Facility: HOSPITAL | Age: 65
End: 2025-07-25
Attending: FAMILY MEDICINE
Payer: MEDICARE

## 2025-07-25 DIAGNOSIS — K58.0 IRRITABLE BOWEL SYNDROME WITH DIARRHEA: ICD-10-CM

## 2025-07-25 DIAGNOSIS — Z00.00 WELLNESS EXAMINATION: Primary | ICD-10-CM

## 2025-07-25 LAB

## 2025-07-25 PROCEDURE — 87507 IADNA-DNA/RNA PROBE TQ 12-25: CPT

## 2025-07-28 ENCOUNTER — OFFICE VISIT (OUTPATIENT)
Facility: CLINIC | Age: 65
End: 2025-07-28
Payer: MEDICARE

## 2025-07-28 ENCOUNTER — TELEPHONE (OUTPATIENT)
Dept: FAMILY MEDICINE | Facility: CLINIC | Age: 65
End: 2025-07-28
Payer: MEDICARE

## 2025-07-28 DIAGNOSIS — G31.84 MILD COGNITIVE IMPAIRMENT: ICD-10-CM

## 2025-07-28 DIAGNOSIS — E66.813 CLASS 3 SEVERE OBESITY DUE TO EXCESS CALORIES WITH SERIOUS COMORBIDITY AND BODY MASS INDEX (BMI) OF 40.0 TO 44.9 IN ADULT: ICD-10-CM

## 2025-07-28 DIAGNOSIS — G47.19 EXCESSIVE DAYTIME SLEEPINESS: ICD-10-CM

## 2025-07-28 DIAGNOSIS — R06.83 SNORING: ICD-10-CM

## 2025-07-28 DIAGNOSIS — R06.81 WITNESSED EPISODE OF APNEA: ICD-10-CM

## 2025-07-28 DIAGNOSIS — G47.33 OBSTRUCTIVE SLEEP APNEA: Primary | ICD-10-CM

## 2025-07-28 NOTE — PROGRESS NOTES
Neurology Clinic - Virtual Visit      Patient ID: [id number]     Subjective:     Chief Complaint: new patient referred by Jason Trujillo NP for LEXUS evaluat    HPI:    Angelique Martin is a 64 y.o. female here today for a telemedicine visit.     Here as new patient referred by Jason Trujillo NP for LEXUS evaluation.   Her  has been telling her for years that she snores and has apneic episodes. She was hesitant to do a sleep study in the past but is eager to proceed with it now. Admits to forgetfulness and brain fog; established with Marianne/Dr. Wasserman for [facility].     Goes to bed around 10 pm, typically falls asleep easily. Takes melatonin rarely if she has trouble falling asleep. Awakens 2-3 times nightly due to nocturia and can take a while to go back to sleep. Awakens at 6:30-7am. Awakens un-refreshed.    Pertinent positives/ negatives:  Snoring:  yes  Witnessed apnea: yes-- witnessed by   Awaken from sleep, gasping for air: yes  Frequent awakenings: yes, 2-3 times per night d/t nocturia  Vivid dreams: no  Excessive daytime sleepiness: yes  Nap during the day: yes about 4 times per week  Sleep study in the past: no     Her mother wears PAP        7/28/2025   EPWORTH SLEEPINESS SCALE   Sitting and reading 3   Watching TV 2   Sitting, inactive in a public place (e.g. a theatre or a meeting) 0   As a passenger in a car for an hour without a break 1   Lying down to rest in the afternoon when circumstances permit 3   Sitting and talking to someone 0   Sitting quietly after a lunch without alcohol 2   In a car, while stopped for a few minutes in traffic 0   Total score 11     This is a telemedicine note. Patient was treated using telemedicine, real time audio and video, according to [facility] protocols. Rhea MCKEON FNP-C, conducted the visit from the [facility]. The patient participated in the visit at a non-[facility] location selected by the patient (or patient's representative), identified  below. I am licensed in the state where the patient stated they are located. The patient (or patient's representative) stated that they understood and accepted the privacy and security risks to their information at their location. This visit is not recorded.    The patient's location is: home  Visit type: audiovisual     Past Medical History:   Diagnosis Date    Anxiety disorder, unspecified     Bronchial asthma     CAD (coronary artery disease)     Cervicalgia     Depression     Family history of ovarian cancer     sister    GERD (gastroesophageal reflux disease)     Hiatal hernia     History of pressure ulcer     Hypothyroidism, unspecified     Osteoarthritis of knee     Personal history of colonic polyps 05/02/2023    s/p polypectomy - Dr Santa Howell, III    Postmenopausal state     Rheumatoid arthritis, unspecified     Thyroid disease     Vitamin D deficiency         Past Surgical History:   Procedure Laterality Date    ABLATION, FIBROID, UTERUS, LAPAROSCOPIC, WITH US GUIDANCE  2019    Dr Tyler Farrell    CHOLECYSTECTOMY  2010    COLONOSCOPY W/ BIOPSIES AND POLYPECTOMY  05/12/2020    Dr Santa Howell, III    COLONOSCOPY W/ BIOPSIES AND POLYPECTOMY  05/02/2023    Dr Santa Howell, III    DILATION AND CURETTAGE OF UTERUS  1981    Dr Tyler Farrell    ESOPHAGOGASTRODUODENOSCOPY  04/25/2019    Dr Santa Howell, III    ESOPHAGOGASTRODUODENOSCOPY  06/27/2023    Dr Santa Howell    HYSTERECTOMY      HYSTERECTOMY, TOTAL, LAPAROSCOPIC, WITH SALPINGO-OOPHORECTOMY Bilateral 04/2022    Dr Tyler Farrell    JOINT REPLACEMENT      SINUS SURGERY  2018    TONSILLECTOMY  1972    TOTAL KNEE ARTHROPLASTY Left 12/28/2021    Dr Abdelrahman Person    TUBAL LIGATION Bilateral 1988    Dr Tyler Farrell        Social History     Tobacco Use    Smoking status: Never    Smokeless tobacco: Never   Substance and Sexual Activity    Alcohol use: Yes     Alcohol/week: 2.0 standard drinks of alcohol     Types: 1 Glasses of wine, 1 Cans of beer  per week     Comment: Weekly    Drug use: Never    Sexual activity: Yes     Partners: Male     Birth control/protection: Post-menopausal, See Surgical Hx        Current Outpatient Medications   Medication Instructions    albuterol (PROVENTIL/VENTOLIN HFA) 90 mcg/actuation inhaler 1 puff, Daily PRN    alosetron (LOTRONEX) 0.5 mg, Daily    aspirin (ECOTRIN) 81 MG EC tablet 1 tablet, Daily    calcium carbonate (CALCIUM 500 ORAL) 1 tablet, Daily    cyanocobalamin 1,000 mcg/mL injection INJECT 1ML ONCE MONTHLY    esomeprazole (NEXIUM) 40 MG capsule 1 capsule, Daily    estradioL (ESTRACE) 1 MG tablet 1 tablet, Daily    furosemide (LASIX) 20 mg, Daily PRN    glucosamine-chondroitin 500-400 mg tablet 1 tablet, 2 times daily    levothyroxine (SYNTHROID) 50 MCG tablet 1 tablet, Daily    multivitamin capsule 1 capsule, Daily    tirzepatide (weight loss) 5 mg, Subcutaneous, Every 7 days    vitamin D (VITAMIN D3) 1,000 Units, Daily       Review of patient's allergies indicates:  No Known Allergies     Review of Systems  12 point review of systems conducted, negative except as stated in the history of present illness. See HPI for details.    Objective:     There were no vitals taken for this visit.  Unable to get a neck circumference 2.2 telemed visit but appears to have a neck circumference  >15 inches    Physical Exam      Physical Exam: LIMITED DUE TO TELEMEDICINE RESTRICTIONS.  General: Alert and oriented, No acute distress.  Speech: clear/ fluent  Eyes: Sclera non-icteric. EOM intact.  Respiratory: Non-labored respirations, pulmonary effort normal  Integumentary:  No visible suspicious lesions or rashes. No jaundice or diaphoresis.   Neurologic: No focal deficits  Psychiatric: Normal interaction, Coherent speech, Euthymic mood, Appropriate affect     Assessment:       ICD-10-CM ICD-9-CM   1. Obstructive sleep apnea  G47.33 327.23   2. Witnessed episode of apnea  R06.81 786.03   3. Excessive daytime sleepiness  G47.19 780.54    4. Mild cognitive impairment  G31.84 331.83   5. Class 3 severe obesity due to excess calories with serious comorbidity and body mass index (BMI) of 40.0 to 44.9 in adult  E66.813 278.01    Z68.41 V85.41   6. Snoring  R06.83 786.09        Plan:     Sleep apnea and it's attendant risks discussed.   Discussed pathophysiology of sleep apnea and mechanism of CPAP for treating LEXUS.   In lab PSG vs home sleep study option and insurance constraints discussed.  Potential need for treatment of LEXUS discussed including CPAP or Bilevel  PAP.   Alternatives to PAP discussed if PAP not ultimately tolerated.  Risks of excessive daytime sleepiness/drowsy driving discussed.  Weight loss discussed. She recently started tirzepatide injections. We discussed FDA approval for Zepbound in pts with LEXUS.   Importance of healthy diet, regular exercise and sleep hygiene discussed    She wishes to proceed with PSG and if PAP therapy is warranted she is willing to come to the lab for CPAP titration night.     Orders Placed This Encounter   Procedures    Polysomnography 4 or more parameters     Follow up TBD after PSG and PAP night.     Future Appointments   Date Time Provider Department Center   10/8/2025 10:40 AM Jason Trujillo NP [facility]   10/22/2025 10:30 AM Marianne Page, [facility] [address] [place]        Video Time Documentation:  I spent a total of 36 minutes on today's visit.   This includes face to face time and non-face to face time preparing to see the patient (eg, review of tests), obtaining and/or reviewing separately obtained history, documenting clinical information in the electronic or other health record, independently interpreting results and communicating results to the patient/family/caregiver, or care coordinator.    Rhea Amezcua, MSN, APRN, FNP-C  [facility]  [phone]

## 2025-07-28 NOTE — TELEPHONE ENCOUNTER
----- Message from Tadeo Ambriz DO sent at 7/28/2025  8:51 AM CDT -----  All lab results within acceptable ranges.  ----- Message -----  From: Lab, Background User  Sent: 7/25/2025   3:25 PM CDT  To: Tadeo Ambriz DO

## 2025-07-29 ENCOUNTER — HOSPITAL ENCOUNTER (EMERGENCY)
Facility: HOSPITAL | Age: 65
Discharge: HOME | End: 2025-07-29
Attending: FAMILY MEDICINE
Payer: COMMERCIAL

## 2025-07-29 VITALS
OXYGEN SATURATION: 97 % | HEART RATE: 73 BPM | SYSTOLIC BLOOD PRESSURE: 123 MMHG | RESPIRATION RATE: 20 BRPM | DIASTOLIC BLOOD PRESSURE: 94 MMHG | TEMPERATURE: 98 F | BODY MASS INDEX: 38.89 KG/M2 | HEIGHT: 66 IN | WEIGHT: 242 LBS

## 2025-07-29 DIAGNOSIS — S60.222A CONTUSION OF LEFT HAND, INITIAL ENCOUNTER: ICD-10-CM
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
## 2025-07-29 DIAGNOSIS — S00.83XA CONTUSION OF FACE, INITIAL ENCOUNTER: Primary | ICD-10-CM

## 2025-07-29 PROCEDURE — 99284 EMERGENCY DEPT VISIT MOD MDM: CPT | Mod: 25

## 2025-07-29 NOTE — ED PROVIDER NOTES
Encounter Date: 7/29/2025       History     Chief Complaint   Patient presents with    Fall     Tripped and fell while getting out of bed. With pain to left hand and right side of head. LOP 5/10.      This patient is a 64-year-old female who states that she tripped and fell while getting out of bed.  She now has pain to the left hand into the right side of the head.  She states that she felt mostly onto her left hand then she fell forward and hit the lateral side of her right eye on a chair that had a pillow on it.  However, she could feel the would below the pillow.  There was minimal swelling around her right eye    The history is provided by the patient.     Review of patient's allergies indicates:  No Known Allergies  Past Medical History:   Diagnosis Date    Anxiety disorder, unspecified     Bronchial asthma     CAD (coronary artery disease)     Cervicalgia     Depression     Family history of ovarian cancer     sister    GERD (gastroesophageal reflux disease)     Hiatal hernia     History of pressure ulcer     Hypothyroidism, unspecified     Osteoarthritis of knee     Personal history of colonic polyps 05/02/2023    s/p polypectomy - Dr Santa Howell, III    Postmenopausal state     Rheumatoid arthritis, unspecified     Thyroid disease     Vitamin D deficiency      Past Surgical History:   Procedure Laterality Date    ABLATION, FIBROID, UTERUS, LAPAROSCOPIC, WITH US GUIDANCE  2019    Dr Tyler Farrell    CHOLECYSTECTOMY  2010    COLONOSCOPY W/ BIOPSIES AND POLYPECTOMY  05/12/2020    Dr Santa Howell, III    COLONOSCOPY W/ BIOPSIES AND POLYPECTOMY  05/02/2023    Dr Santa Howell, III    DILATION AND CURETTAGE OF UTERUS  1981    Dr Tyler Farrell    ESOPHAGOGASTRODUODENOSCOPY  04/25/2019    Dr Santa Howell, III    ESOPHAGOGASTRODUODENOSCOPY  06/27/2023    Dr Santa Howell    HYSTERECTOMY      HYSTERECTOMY, TOTAL, LAPAROSCOPIC, WITH SALPINGO-OOPHORECTOMY Bilateral 04/2022    Dr Tyler Farrell    JOINT REPLACEMENT       SINUS SURGERY  2018    TONSILLECTOMY  1972    TOTAL KNEE ARTHROPLASTY Left 12/28/2021    Dr Abdelrahman Person    TUBAL LIGATION Bilateral 1988    Dr Tyler Farrell     Family History   Problem Relation Name Age of Onset    Heart disease Mother Luann     Hypertension Mother Luann     Arthritis Mother Luann     Paranoid behavior Father      Pneumonia Father      Ovarian cancer Sister Janna     Depression Sister Janna     Anxiety disorder Sister Janna     Arthritis Brother Semaj      Social History[1]  Review of Systems   Constitutional: Negative.    HENT:  Positive for facial swelling.    Respiratory: Negative.     Cardiovascular: Negative.    Endocrine: Negative.    Musculoskeletal:         Left hand pain and swelling   Neurological: Negative.    Psychiatric/Behavioral: Negative.     All other systems reviewed and are negative.      Physical Exam     Initial Vitals [07/29/25 1552]   BP Pulse Resp Temp SpO2   (!) 123/94 73 20 98 °F (36.7 °C) 97 %      MAP       --         Physical Exam    Nursing note and vitals reviewed.  Constitutional: She appears well-developed.   HENT:   Head: Normocephalic.       Eyes: Pupils are equal, round, and reactive to light.   Neck:   Normal range of motion.  Cardiovascular:  Normal rate, regular rhythm and normal heart sounds.           Pulmonary/Chest: Breath sounds normal.   Abdominal: Abdomen is soft.   Musculoskeletal:         General: Normal range of motion.        Hands:       Cervical back: Normal range of motion.      Comments: Pain to the left hand     Neurological: She is alert and oriented to person, place, and time.   Skin: Skin is warm and dry.   Psychiatric: She has a normal mood and affect.         ED Course   Procedures  Labs Reviewed - No data to display       Imaging Results              X-Ray Facial Bones  3 Or More View (Final result)  Result time 07/29/25 17:11:40      Final result by German Khoury MD (07/29/25 17:11:40)                   Narrative:     EXAMINATION  XR FACIAL BONES 3 OR MORE VIEW    CLINICAL HISTORY  Unspecified fall, initial encounter    TECHNIQUE  A total of 4 view(s) of the face.    COMPARISON  None available at the time of initial interpretation.    FINDINGS  No acute osseous displacement is appreciated. The facial cavities and mastoid air cells are well aerated. There is no evidence of air-fluid level within the sinuses to suggest acute inflammatory change or hemosinus.    The soft tissues are without focal finding.    IMPRESSION  No acute abnormality identified.      Electronically signed by: German Khoury  Date:    07/29/2025  Time:    17:11                                     X-Ray Hand 3 view Left (Final result)  Result time 07/29/25 17:10:51      Final result by Johanny Webster MD (07/29/25 17:10:51)                   Impression:      No acute bony abnormality.      Electronically signed by: Johanny Webster  Date:    07/29/2025  Time:    17:10               Narrative:    EXAMINATION:  XR HAND COMPLETE 3 VIEW LEFT    CLINICAL HISTORY:  fall;    COMPARISON:  None.    FINDINGS:  There is no acute fracture or malalignment.  The soft tissues are unremarkable.                                       Medications - No data to display  Medical Decision Making  This patient is a 64-year-old female who states that she fell while getting out of bed this afternoon.  She states she fell mostly on her left hand but also hit the lateral side of her right eye.  She tripped over her blanket  Differential diagnosis:  Orbital fracture, finger fracture    Amount and/or Complexity of Data Reviewed  Radiology: ordered.     Details: X-rays done of the facial bones show no fractures  Left hand x-ray shows no acute bony abnormality                                          Clinical Impression:  Final diagnoses:  [W19.XXXA] Fall  [S00.83XA] Contusion of face, initial encounter (Primary)  [S60.222A] Contusion of left hand, initial encounter          ED Disposition  Condition    Discharge Stable          ED Prescriptions    None       Follow-up Information       Follow up With Specialties Details Why Contact Info    Tadeo Ambriz DO Family Medicine Schedule an appointment as soon as possible for a visit in 2 days  [address] [facility] [address]  [phone]                     [1]   Social History  Tobacco Use    Smoking status: Never    Smokeless tobacco: Never   Substance Use Topics    Alcohol use: Yes     Alcohol/week: 2.0 standard drinks of alcohol     Types: 1 Glasses of wine, 1 Cans of beer per week     Comment: Weekly    Drug use: Never        Vita Duggan MD  07/29/25 2774

## 2025-09-04 ENCOUNTER — PROCEDURE VISIT (OUTPATIENT)
Dept: SLEEP MEDICINE | Facility: HOSPITAL | Age: 65
End: 2025-09-04
Payer: COMMERCIAL

## 2025-09-04 DIAGNOSIS — G31.84 MILD COGNITIVE IMPAIRMENT: ICD-10-CM

## 2025-09-04 DIAGNOSIS — G47.33 OBSTRUCTIVE SLEEP APNEA: ICD-10-CM

## 2025-09-04 DIAGNOSIS — R06.83 SNORING: ICD-10-CM

## 2025-09-04 DIAGNOSIS — R06.81 WITNESSED EPISODE OF APNEA: ICD-10-CM

## 2025-09-04 DIAGNOSIS — G47.19 EXCESSIVE DAYTIME SLEEPINESS: ICD-10-CM

## 2025-09-04 DIAGNOSIS — E66.813 CLASS 3 SEVERE OBESITY DUE TO EXCESS CALORIES WITH SERIOUS COMORBIDITY AND BODY MASS INDEX (BMI) OF 40.0 TO 44.9 IN ADULT: ICD-10-CM

## 2025-09-04 PROCEDURE — 95810 POLYSOM 6/> YRS 4/> PARAM: CPT
